# Patient Record
Sex: MALE | Race: AMERICAN INDIAN OR ALASKA NATIVE | NOT HISPANIC OR LATINO | Employment: FULL TIME | URBAN - METROPOLITAN AREA
[De-identification: names, ages, dates, MRNs, and addresses within clinical notes are randomized per-mention and may not be internally consistent; named-entity substitution may affect disease eponyms.]

---

## 2017-01-05 ENCOUNTER — TELEPHONE (OUTPATIENT)
Dept: FAMILY MEDICINE | Facility: CLINIC | Age: 52
End: 2017-01-05

## 2017-01-05 NOTE — TELEPHONE ENCOUNTER
Patient calling and wanting to increase the dosage of several medications.Advised to schedule an appointment to follow up on medications atenolol,gabapentin,and testosterone.Pt increased his b/p med a month ago to a tab and a half and says he is feeling much better. States that his most recent B/P was 160/97. Transferred patient to  for an appointment.

## 2017-01-23 ENCOUNTER — OFFICE VISIT (OUTPATIENT)
Dept: FAMILY MEDICINE | Facility: CLINIC | Age: 52
End: 2017-01-23
Payer: COMMERCIAL

## 2017-01-23 VITALS
WEIGHT: 264 LBS | DIASTOLIC BLOOD PRESSURE: 100 MMHG | OXYGEN SATURATION: 98 % | HEIGHT: 72 IN | BODY MASS INDEX: 35.76 KG/M2 | SYSTOLIC BLOOD PRESSURE: 150 MMHG | TEMPERATURE: 97.8 F | HEART RATE: 74 BPM | RESPIRATION RATE: 18 BRPM

## 2017-01-23 DIAGNOSIS — N52.9 ERECTILE DYSFUNCTION, UNSPECIFIED ERECTILE DYSFUNCTION TYPE: ICD-10-CM

## 2017-01-23 DIAGNOSIS — K21.9 GASTROESOPHAGEAL REFLUX DISEASE WITHOUT ESOPHAGITIS: ICD-10-CM

## 2017-01-23 DIAGNOSIS — G89.29 CHRONIC MIDLINE LOW BACK PAIN WITHOUT SCIATICA: ICD-10-CM

## 2017-01-23 DIAGNOSIS — E78.5 HYPERLIPIDEMIA LDL GOAL <130: ICD-10-CM

## 2017-01-23 DIAGNOSIS — E29.1 TESTICULAR HYPOFUNCTION: ICD-10-CM

## 2017-01-23 DIAGNOSIS — Z11.59 NEED FOR HEPATITIS C SCREENING TEST: ICD-10-CM

## 2017-01-23 DIAGNOSIS — Z12.11 SCREEN FOR COLON CANCER: Primary | ICD-10-CM

## 2017-01-23 DIAGNOSIS — I10 ESSENTIAL HYPERTENSION: ICD-10-CM

## 2017-01-23 DIAGNOSIS — M54.50 CHRONIC MIDLINE LOW BACK PAIN WITHOUT SCIATICA: ICD-10-CM

## 2017-01-23 LAB
ERYTHROCYTE [DISTWIDTH] IN BLOOD BY AUTOMATED COUNT: 12.9 % (ref 10–15)
HCT VFR BLD AUTO: 42.8 % (ref 40–53)
HGB BLD-MCNC: 15.6 G/DL (ref 13.3–17.7)
MCH RBC QN AUTO: 28.6 PG (ref 26.5–33)
MCHC RBC AUTO-ENTMCNC: 36.4 G/DL (ref 31.5–36.5)
MCV RBC AUTO: 79 FL (ref 78–100)
PLATELET # BLD AUTO: 239 10E9/L (ref 150–450)
RBC # BLD AUTO: 5.45 10E12/L (ref 4.4–5.9)
WBC # BLD AUTO: 6.2 10E9/L (ref 4–11)

## 2017-01-23 PROCEDURE — 36415 COLL VENOUS BLD VENIPUNCTURE: CPT | Performed by: FAMILY MEDICINE

## 2017-01-23 PROCEDURE — 84443 ASSAY THYROID STIM HORMONE: CPT | Performed by: FAMILY MEDICINE

## 2017-01-23 PROCEDURE — 80061 LIPID PANEL: CPT | Performed by: FAMILY MEDICINE

## 2017-01-23 PROCEDURE — 80048 BASIC METABOLIC PNL TOTAL CA: CPT | Performed by: FAMILY MEDICINE

## 2017-01-23 PROCEDURE — 85027 COMPLETE CBC AUTOMATED: CPT | Performed by: FAMILY MEDICINE

## 2017-01-23 PROCEDURE — 99213 OFFICE O/P EST LOW 20 MIN: CPT | Performed by: FAMILY MEDICINE

## 2017-01-23 PROCEDURE — 86803 HEPATITIS C AB TEST: CPT | Performed by: FAMILY MEDICINE

## 2017-01-23 RX ORDER — AMLODIPINE BESYLATE 5 MG/1
5 TABLET ORAL DAILY
Qty: 30 TABLET | Refills: 1 | Status: SHIPPED | OUTPATIENT
Start: 2017-01-23 | End: 2017-03-20

## 2017-01-23 RX ORDER — GABAPENTIN 400 MG/1
400 CAPSULE ORAL 3 TIMES DAILY
Qty: 90 CAPSULE | Refills: 5 | Status: SHIPPED | OUTPATIENT
Start: 2017-01-23 | End: 2017-04-03

## 2017-01-23 RX ORDER — NEBULIZER AND COMPRESSOR
EACH MISCELLANEOUS
Qty: 1 KIT | Refills: 0 | Status: SHIPPED | OUTPATIENT
Start: 2017-01-23 | End: 2019-06-13

## 2017-01-23 NOTE — PROGRESS NOTES
SUBJECTIVE:                                                    Dion Yepez is a 51 year old male who presents to clinic today for the following health issues:    Health Maintenance Due   Topic Date Due     HEPATITIS C SCREENING  09/02/1983     COLON CANCER SCREEN (SYSTEM ASSIGNED)  09/02/2015     Health Maintenance reviewed at today's visit patient asked to schedule/complete:   Colon Cancer:  Patient declined        Hypertension Follow-up      Outpatient blood pressures are being checked at home.  Results are high. Pt has increased BP meds to 1 and 1/2 tabs.    Low Salt Diet: no added salt       Amount of exercise or physical activity: rare     Problems taking medications regularly: No    Medication side effects: none  Diet: low salt and low fat/cholesterol  Medication Followup of Testosterone and gabapentin    Taking Medication as prescribed: yes    Side Effects:  None    Medication Helping Symptoms:  NO-pt would like to increase both       PROBLEMS TO ADD ON...    Problem list and histories reviewed & adjusted, as indicated.  Additional history: as documented  Hypertension and poor libido. Gives testosterone shots at home. Discussed getting tsh as still poor libido and if that's normal see urology. He is due for colonoscopy and tariq has upper gi sx.     Patient Active Problem List   Diagnosis     Chronic Back Pain     Testicular hypofunction     GERD (gastroesophageal reflux disease)     HYPERLIPIDEMIA LDL GOAL <130     Anxiety     Benign hypertension     Past Surgical History   Procedure Laterality Date     C reduce testis torsion  1978     Implant implantable cardioverter defibrillator  12/2010     Heart cath left heart cath  12/2010     [facility] - normal coronary arteries        Social History   Substance Use Topics     Smoking status: Never Smoker      Smokeless tobacco: Never Used     Alcohol Use: No     Family History   Problem Relation Age of Onset     Thyroid Disease Mother      Cancer -  colorectal Father      Cancer - colorectal Paternal Grandmother      Cancer - colorectal Paternal Grandfather      Cancer - colorectal Paternal Aunt      Cancer - colorectal Paternal Uncle      DIABETES No family hx of      Coronary Artery Disease No family hx of      Hypertension No family hx of      Hyperlipidemia No family hx of      CEREBROVASCULAR DISEASE No family hx of      Breast Cancer No family hx of      Colon Cancer No family hx of      Prostate Cancer No family hx of      Other Cancer No family hx of      Depression No family hx of      Anxiety Disorder No family hx of      MENTAL ILLNESS No family hx of      Substance Abuse No family hx of      Anesthesia Reaction No family hx of      Asthma No family hx of      OSTEOPOROSIS No family hx of      Genetic Disorder No family hx of      Obesity No family hx of      Unknown/Adopted No family hx of            ROS:  CONSTITUTIONAL:NEGATIVE for fever, chills, change in weight  INTEGUMENTARY/SKIN: NEGATIVE for worrisome rashes, moles or lesions  EYES: NEGATIVE for vision changes or irritation  ENT/MOUTH: NEGATIVE for ear, mouth and throat problems  RESP:NEGATIVE for significant cough or SOB  CV: NEGATIVE for chest pain, palpitations or peripheral edema  GI: NEGATIVE for nausea, abdominal pain, heartburn, or change in bowel habits  : erectile dysfunction  ENDOCRINE: NEGATIVE for temperature intolerance, skin/hair changes    OBJECTIVE:                                                    /100 mmHg  Pulse 74  Temp(Src) 97.8  F (36.6  C)  Resp 18  Ht 6' (1.829 m)  Wt 264 lb (119.75 kg)  BMI 35.80 kg/m2  SpO2 98%  Body mass index is 35.8 kg/(m^2).  GENERAL APPEARANCE: healthy, alert and no distress  EYES: Eyes grossly normal to inspection, PERRL and conjunctivae and sclerae normal  RESP: lungs clear to auscultation - no rales, rhonchi or wheezes  CV: regular rates and rhythm, normal S1 S2, no S3 or S4 and no murmur, click or rub    Diagnostic test  results:  Diagnostic Test Results:  Labs as ordered.      ASSESSMENT/PLAN:                                                    1. Screen for colon cancer    - Fecal colorectal cancer screen FIT - Future (S+30); Future  - GASTROENTEROLOGY ADULT REF PROCEDURE ONLY    2. Need for hepatitis C screening test    - Hepatitis C Screen Reflex to HCV RNA Quant and Genotype    3. Hyperlipidemia LDL goal <130    - Lipid Profile with reflex to direct LDL    4. Testicular hypofunction      5. Essential hypertension    - CBC with platelets  - Basic metabolic panel  - amLODIPine (NORVASC) 5 MG tablet; Take 1 tablet (5 mg) by mouth daily  Dispense: 30 tablet; Refill: 1  - Blood Pressure Monitoring (ADULT BLOOD PRESSURE CUFF LG) KIT; One unit  Dispense: 1 kit; Refill: 0    6. Chronic midline low back pain without sciatica    - gabapentin (NEURONTIN) 400 MG capsule; Take 1 capsule (400 mg) by mouth 3 times daily  Dispense: 90 capsule; Refill: 5  - amLODIPine (NORVASC) 5 MG tablet; Take 1 tablet (5 mg) by mouth daily  Dispense: 30 tablet; Refill: 1    7. Erectile dysfunction, unspecified erectile dysfunction type    - TSH    8. Gastroesophageal reflux disease without esophagitis    - GASTROENTEROLOGY ADULT REF PROCEDURE ONLY      Referral for gi and will see dr carver in [place].   Follow up with Provider - 2-4 weeks or as needed.      Dar Pascual MD  [facility]

## 2017-01-23 NOTE — PATIENT INSTRUCTIONS
Dr. Junior Larry MD  [address]  [address] [place]  T: [phone]  F: [phone]            [facility]  [address]  [address].  [facility]. [address]    [phone]    Dr machado, Dr. Moon

## 2017-01-23 NOTE — MR AVS SNAPSHOT
After Visit Summary   1/23/2017    Dion Yepez    MRN: [id number]           Patient Information     Date Of Birth          1965        Visit Information        Provider Department      1/23/2017 10:45 AM Dar Pascual MD [facility]        Today's Diagnoses     Screen for colon cancer    -  1     Need for hepatitis C screening test         Hyperlipidemia LDL goal <130         Testicular hypofunction         Essential hypertension         Chronic midline low back pain without sciatica         Erectile dysfunction, unspecified erectile dysfunction type           Care Instructions        Dr. Junior Larry MD  [address]  [address] [place]  T: [phone]  F: [phone]            [facility]  [address]  [address].  [facility]. [address]    [phone]    Dr machado, Dr. Moon            Follow-ups after your visit        Future tests that were ordered for you today     Open Future Orders        Priority Expected Expires Ordered    Fecal colorectal cancer screen FIT - Future (S+30) Routine 2/13/2017 2/22/2017 1/23/2017            Who to contact     If you have questions or need follow up information about today's clinic visit or your schedule please contact [facility] directly at [phone].  Normal or non-critical lab and imaging results will be communicated to you by MyChart, letter or phone within 4 business days after the clinic has received the results. If you do not hear from us within 7 days, please contact the clinic through MyChart or phone. If you have a critical or abnormal lab result, we will notify you by phone as soon as possible.  Submit refill requests through [employer] or call your pharmacy and they will forward the refill request to us. Please allow 3 business days for your refill to be completed.          Additional Information About Your Visit         [facility] [employer] Information     [employer] gives you secure access to your electronic health record. If you see a primary care provider, you can also send messages to your care team and make appointments. If you have questions, please call your primary care clinic.  If you do not have a primary care provider, please call [phone] and they will assist you.        Care EveryWhere ID     This is your Care EveryWhere ID. This could be used by other organizations to access your [place] medical records  [id number]        Your Vitals Were     Pulse Temperature Respirations Height BMI (Body Mass Index) Pulse Oximetry    74 97.8  F (36.6  C) 18 6' (1.829 m) 35.80 kg/m2 98%       Blood Pressure from Last 3 Encounters:   01/23/17 150/100   08/25/16 124/86   03/14/16 150/100    Weight from Last 3 Encounters:   01/23/17 264 lb (119.75 kg)   03/14/16 250 lb (113.399 kg)   11/16/15 232 lb (105.235 kg)              We Performed the Following     Basic metabolic panel     CBC with platelets     Hepatitis C Screen Reflex to HCV RNA Quant and Genotype     Lipid Profile with reflex to direct LDL     TSH          Today's Medication Changes          These changes are accurate as of: 1/23/17 11:23 AM.  If you have any questions, ask your nurse or doctor.               Start taking these medicines.        Dose/Directions    amLODIPine 5 MG tablet   Commonly known as:  NORVASC   Used for:  Essential hypertension, Chronic midline low back pain without sciatica   Started by:  Dar Pascual MD        Dose:  5 mg   Take 1 tablet (5 mg) by mouth daily   Quantity:  30 tablet   Refills:  1         These medicines have changed or have updated prescriptions.        Dose/Directions    * gabapentin 300 MG capsule   Commonly known as:  NEURONTIN   This may have changed:  Another medication with the same name was added. Make sure you understand how and when to take each.   Used for:  Midline low back pain without sciatica   Changed by:  Ankur  Dar Rodríguez MD        TAKE 1 CAPSULE BY MOUTH THREE TIMES A DAY   Quantity:  90 capsule   Refills:  9       * gabapentin 400 MG capsule   Commonly known as:  NEURONTIN   This may have changed:  You were already taking a medication with the same name, and this prescription was added. Make sure you understand how and when to take each.   Used for:  Chronic midline low back pain without sciatica   Changed by:  Dar Pascual MD        Dose:  400 mg   Take 1 capsule (400 mg) by mouth 3 times daily   Quantity:  90 capsule   Refills:  5       * Notice:  This list has 2 medication(s) that are the same as other medications prescribed for you. Read the directions carefully, and ask your doctor or other care provider to review them with you.         Where to get your medicines      These medications were sent to [facility] [address]  [address] [address]     Phone:  [phone]    - amLODIPine 5 MG tablet  - gabapentin 400 MG capsule             Primary Care Provider Office Phone # Fax #    Dar Pascual [phone]       [facility] [address]  [facility] [address]        Thank you!     Thank you for choosing [facility]  for your care. Our goal is always to provide you with excellent care. Hearing back from our patients is one way we can continue to improve our services. Please take a few minutes to complete the written survey that you may receive in the mail after your visit with us. Thank you!             Your Updated Medication List - Protect others around you: Learn how to safely use, store and throw away your medicines at www.disposemymeds.org.          This list is accurate as of: 1/23/17 11:23 AM.  Always use your most recent med list.                   Brand Name Dispense Instructions for use    amLODIPine 5 MG tablet    NORVASC    30 tablet    Take  "1 tablet (5 mg) by mouth daily       atenolol 25 MG tablet    TENORMIN    90 tablet    TAKE 1 TABLET BY MOUTH EVERY DAY       * B-D LUER-TONI SYRINGE 20G X 1\" 3 ML Misc   Generic drug:  Syringe/Needle (Disp)          * Syringe Luer Lock 23G X 1\" 3 ML Misc     12 each    1 Syringe every 21 days       cyclobenzaprine 10 MG tablet    FLEXERIL    30 tablet    TAKE 1/2 TO 1 TABLET BY MOUTH THREE TIMES A DAY AS NEEDED FOR MUSCLE SPASMS       * gabapentin 300 MG capsule    NEURONTIN    90 capsule    TAKE 1 CAPSULE BY MOUTH THREE TIMES A DAY       * gabapentin 400 MG capsule    NEURONTIN    90 capsule    Take 1 capsule (400 mg) by mouth 3 times daily       ibuprofen 800 MG tablet    ADVIL/MOTRIN    90 tablet    TAKE 1 TABLET BY MOUTH EVERY 8 HOURS AS NEEDED FOR PAIN       lisinopril-hydrochlorothiazide 20-12.5 MG per tablet    PRINZIDE/ZESTORETIC    90 tablet    Take 1 tablet by mouth daily       LORazepam 1 MG tablet    ATIVAN    50 tablet    Take 1 tablet (1 mg) by mouth every 8 hours as needed for anxiety       omeprazole 40 MG capsule    priLOSEC    90 capsule    TAKE 1 CAPSULE BY MOUTH EVERY DAY       order for DME      1 proten shake a day       SUBOXONE 8-2 MG per film   Generic drug:  buprenorphine HCl-naloxone HCl      Place 1 Film under the tongue 3 times daily       Syringe/Needle (Disp) 20G X 1\" 1 ML Misc     10 Device    Inject 1 mL into the muscle every 21 days       testosterone cypionate 200 MG/ML injection    DEPOTESTOTERONE CYPIONATE    10 mL    INJECT 1 ML INTRAMUSCULARLY EVERY 21 DAYS       * Notice:  This list has 4 medication(s) that are the same as other medications prescribed for you. Read the directions carefully, and ask your doctor or other care provider to review them with you.      "

## 2017-01-23 NOTE — Clinical Note
[place] [address]  [address]  [facility] [id number] [phone]                                                                                                           Dion Yepez  [address] [place] [address]    January 31, 2017      Dear Dion,    The results of your recent tests were reviewed and are enclosed.       Results for orders placed or performed in visit on 01/23/17   Hepatitis C Screen Reflex to HCV RNA Quant and Genotype   Result Value Ref Range    Hepatitis C Antibody  NR     Nonreactive   Assay performance characteristics have not been established for newborns,   infants, and children     TSH   Result Value Ref Range    TSH 3.83 0.40 - 4.00 mU/L   Lipid Profile with reflex to direct LDL   Result Value Ref Range    Cholesterol 173 <200 mg/dL    Triglycerides 82 <150 mg/dL    HDL Cholesterol 49 >39 mg/dL    LDL Cholesterol Calculated 108 (H) <100 mg/dL    Non HDL Cholesterol 124 <130 mg/dL   CBC with platelets   Result Value Ref Range    WBC 6.2 4.0 - 11.0 10e9/L    RBC Count 5.45 4.4 - 5.9 10e12/L    Hemoglobin 15.6 13.3 - 17.7 g/dL    Hematocrit 42.8 40.0 - 53.0 %    MCV 79 78 - 100 fl    MCH 28.6 26.5 - 33.0 pg    MCHC 36.4 31.5 - 36.5 g/dL    RDW 12.9 10.0 - 15.0 %    Platelet Count 239 150 - 450 10e9/L   Basic metabolic panel   Result Value Ref Range    Sodium 139 133 - 144 mmol/L    Potassium 4.7 3.4 - 5.3 mmol/L    Chloride 103 94 - 109 mmol/L    Carbon Dioxide 32 20 - 32 mmol/L    Anion Gap 4 3 - 14 mmol/L    Glucose 103 (H) 70 - 99 mg/dL    Urea Nitrogen 13 7 - 30 mg/dL    Creatinine 0.85 0.66 - 1.25 mg/dL    GFR Estimate >90  Non  GFR Calc   >60 mL/min/1.7m2    GFR Estimate If Black >90   GFR Calc   >60 mL/min/1.7m2    Calcium 9.4 8.5 - 10.1 mg/dL           Thank you for choosing [facility].  We appreciate the opportunity to serve you and look forward to  supporting your healthcare needs in the future.    If you have any questions or concerns, please call me or my staff at [phone].      Sincerely,    Dar Pascual MD

## 2017-01-23 NOTE — Clinical Note
"[facility]  [address]  [address]  [facility] [id number] [phone]      January 23, 2017        Dr. Junior Larry MD  [address]  [address] [place]  T: [phone]  F: [phone]      Dion Yepez  [address]  [place] [facility] [address]        Dear Dr. Larry:  Mr. Yepez was seen today. He has upper gi reflux and also is due for colonoscopy.  Please evaluate for these.    Patient Active Problem List   Diagnosis     Chronic Back Pain     Testicular hypofunction     GERD (gastroesophageal reflux disease)     HYPERLIPIDEMIA LDL GOAL <130     Anxiety     Benign hypertension     Current Outpatient Prescriptions   Medication Sig Dispense Refill     gabapentin (NEURONTIN) 400 MG capsule Take 1 capsule (400 mg) by mouth 3 times daily 90 capsule 5     amLODIPine (NORVASC) 5 MG tablet Take 1 tablet (5 mg) by mouth daily 30 tablet 1     Blood Pressure Monitoring (ADULT BLOOD PRESSURE CUFF LG) KIT One unit 1 kit 0     cyclobenzaprine (FLEXERIL) 10 MG tablet TAKE 1/2 TO 1 TABLET BY MOUTH THREE TIMES A DAY AS NEEDED FOR MUSCLE SPASMS 30 tablet 1     atenolol (TENORMIN) 25 MG tablet TAKE 1 TABLET BY MOUTH EVERY DAY 90 tablet 0     ibuprofen (ADVIL/MOTRIN) 800 MG tablet TAKE 1 TABLET BY MOUTH EVERY 8 HOURS AS NEEDED FOR PAIN 90 tablet 0     omeprazole (PRILOSEC) 40 MG capsule TAKE 1 CAPSULE BY MOUTH EVERY DAY 90 capsule 0     Syringe/Needle, Disp, (SYRINGE LUER LOCK) 23G X 1\" 3 ML MISC 1 Syringe every 21 days 12 each 6     testosterone cypionate (DEPOTESTOTERONE CYPIONATE) 200 MG/ML injection INJECT 1 ML INTRAMUSCULARLY EVERY 21 DAYS 10 mL 0     gabapentin (NEURONTIN) 300 MG capsule TAKE 1 CAPSULE BY MOUTH THREE TIMES A DAY 90 capsule 9     order for DME 1 proten shake a day       LORazepam (ATIVAN) 1 MG tablet Take 1 tablet (1 mg) by mouth every 8 hours as needed for anxiety 50 tablet 2     SUBOXONE 8-2 MG film Place 1 Film under the " "tongue 3 times daily   0     B-D LUER-TONI SYRINGE 20G X 1\" 3 ML MISC        Syringe/Needle, Disp, 20G X 1\" 1 ML MISC Inject 1 mL into the muscle every 21 days 10 Device 11     lisinopril-hydrochlorothiazide (PRINZIDE,ZESTORETIC) 20-12.5 MG per tablet Take 1 tablet by mouth daily 90 tablet 0     Thank you for seeing Mr. Yepez.       Sincerely,      Dar Pascual MD            "

## 2017-01-23 NOTE — NURSING NOTE
Chief Complaint   Patient presents with     Recheck Medication       Initial /100 mmHg  Pulse 74  Temp(Src) 97.8  F (36.6  C)  Resp 18  Ht 6' (1.829 m)  Wt 264 lb (119.75 kg)  BMI 35.80 kg/m2  SpO2 98% Estimated body mass index is 35.8 kg/(m^2) as calculated from the following:    Height as of this encounter: 6' (1.829 m).    Weight as of this encounter: 264 lb (119.75 kg).  BP completed using cuff size: large  S Gutzman CMA

## 2017-01-24 LAB
ANION GAP SERPL CALCULATED.3IONS-SCNC: 4 MMOL/L (ref 3–14)
BUN SERPL-MCNC: 13 MG/DL (ref 7–30)
CALCIUM SERPL-MCNC: 9.4 MG/DL (ref 8.5–10.1)
CHLORIDE SERPL-SCNC: 103 MMOL/L (ref 94–109)
CHOLEST SERPL-MCNC: 173 MG/DL
CO2 SERPL-SCNC: 32 MMOL/L (ref 20–32)
CREAT SERPL-MCNC: 0.85 MG/DL (ref 0.66–1.25)
GFR SERPL CREATININE-BSD FRML MDRD: ABNORMAL ML/MIN/1.7M2
GLUCOSE SERPL-MCNC: 103 MG/DL (ref 70–99)
HCV AB SERPL QL IA: NORMAL
HDLC SERPL-MCNC: 49 MG/DL
LDLC SERPL CALC-MCNC: 108 MG/DL
NONHDLC SERPL-MCNC: 124 MG/DL
POTASSIUM SERPL-SCNC: 4.7 MMOL/L (ref 3.4–5.3)
SODIUM SERPL-SCNC: 139 MMOL/L (ref 133–144)
TRIGL SERPL-MCNC: 82 MG/DL
TSH SERPL DL<=0.05 MIU/L-ACNC: 3.83 MU/L (ref 0.4–4)

## 2017-02-21 DIAGNOSIS — M54.30 SCIATICA, UNSPECIFIED LATERALITY: ICD-10-CM

## 2017-02-21 NOTE — TELEPHONE ENCOUNTER
Flexeril      Last Written Prescription Date:  1/5/2017  Last Fill Quantity: 30,   # refills: 1  Last Office Visit with [facility] [place] prescribing provider: 1/23/2017  Future Office visit:       Routing refill request to provider for review/approval because:  Drug not on the [facility] [place] refill protocol or controlled substance

## 2017-02-27 RX ORDER — CYCLOBENZAPRINE HCL 10 MG
TABLET ORAL
Qty: 30 TABLET | Refills: 1 | Status: SHIPPED | OUTPATIENT
Start: 2017-02-27 | End: 2017-04-04

## 2017-03-01 DIAGNOSIS — M54.50 CHRONIC MIDLINE LOW BACK PAIN WITHOUT SCIATICA: ICD-10-CM

## 2017-03-01 DIAGNOSIS — G89.29 CHRONIC MIDLINE LOW BACK PAIN WITHOUT SCIATICA: ICD-10-CM

## 2017-03-01 NOTE — TELEPHONE ENCOUNTER
Ibuprofen 800 mg      Last Written Prescription Date: 2/1/17  Last Quantity: 90, # refills: 0  [place] [facility] [place] [facility] prescribing provider: 1/23/17       Creatinine   Date Value Ref Range Status   01/23/2017 0.85 0.66 - 1.25 mg/dL Final     Lab Results   Component Value Date    AST 48 02/12/2016     Lab Results   Component Value Date    ALT 58 02/12/2016     BP Readings from Last 3 Encounters:   01/23/17 (!) 150/100   08/25/16 124/86   03/14/16 (!) 150/100

## 2017-03-02 RX ORDER — IBUPROFEN 800 MG/1
TABLET, FILM COATED ORAL
Qty: 90 TABLET | Refills: 0 | Status: SHIPPED | OUTPATIENT
Start: 2017-03-02 | End: 2017-03-20

## 2017-03-06 ENCOUNTER — OFFICE VISIT (OUTPATIENT)
Dept: FAMILY MEDICINE | Facility: CLINIC | Age: 52
End: 2017-03-06
Payer: COMMERCIAL

## 2017-03-06 ENCOUNTER — TELEPHONE (OUTPATIENT)
Dept: FAMILY MEDICINE | Facility: CLINIC | Age: 52
End: 2017-03-06

## 2017-03-06 VITALS
SYSTOLIC BLOOD PRESSURE: 142 MMHG | TEMPERATURE: 98.1 F | HEART RATE: 69 BPM | OXYGEN SATURATION: 96 % | RESPIRATION RATE: 18 BRPM | BODY MASS INDEX: 36.89 KG/M2 | WEIGHT: 272 LBS | DIASTOLIC BLOOD PRESSURE: 94 MMHG

## 2017-03-06 DIAGNOSIS — M54.50 CHRONIC MIDLINE LOW BACK PAIN WITHOUT SCIATICA: Primary | ICD-10-CM

## 2017-03-06 DIAGNOSIS — K29.50 CHRONIC GASTRITIS WITHOUT BLEEDING, UNSPECIFIED GASTRITIS TYPE: ICD-10-CM

## 2017-03-06 DIAGNOSIS — K29.50 CHRONIC GASTRITIS WITHOUT BLEEDING, UNSPECIFIED GASTRITIS TYPE: Primary | ICD-10-CM

## 2017-03-06 DIAGNOSIS — G89.29 CHRONIC MIDLINE LOW BACK PAIN WITHOUT SCIATICA: Primary | ICD-10-CM

## 2017-03-06 DIAGNOSIS — E29.1 TESTICULAR HYPOFUNCTION: ICD-10-CM

## 2017-03-06 DIAGNOSIS — Z12.11 SCREEN FOR COLON CANCER: ICD-10-CM

## 2017-03-06 PROCEDURE — 99214 OFFICE O/P EST MOD 30 MIN: CPT | Performed by: FAMILY MEDICINE

## 2017-03-06 RX ORDER — TESTOSTERONE CYPIONATE 200 MG/ML
INJECTION, SOLUTION INTRAMUSCULAR
Qty: 10 ML | Refills: 0 | Status: SHIPPED | OUTPATIENT
Start: 2017-03-06 | End: 2017-03-06

## 2017-03-06 RX ORDER — SYRINGE W-NEEDLE,DISPOSAB,3 ML 23GX1"
1 SYRINGE, EMPTY DISPOSABLE MISCELLANEOUS
Qty: 12 EACH | Refills: 6 | Status: SHIPPED | OUTPATIENT
Start: 2017-03-06 | End: 2018-01-20

## 2017-03-06 RX ORDER — CIMETIDINE 300 MG
300 TABLET ORAL AT BEDTIME
Qty: 90 TABLET | Refills: 1 | Status: SHIPPED | OUTPATIENT
Start: 2017-03-06 | End: 2019-06-13

## 2017-03-06 RX ORDER — GABAPENTIN 600 MG/1
600 TABLET ORAL 3 TIMES DAILY
Qty: 90 TABLET | Refills: 1 | Status: SHIPPED | OUTPATIENT
Start: 2017-03-06 | End: 2017-04-25 | Stop reason: DRUGHIGH

## 2017-03-06 RX ORDER — TESTOSTERONE CYPIONATE 200 MG/ML
INJECTION, SOLUTION INTRAMUSCULAR
Qty: 10 ML | Refills: 0 | Status: SHIPPED | OUTPATIENT
Start: 2017-03-06 | End: 2017-05-22

## 2017-03-06 NOTE — MR AVS SNAPSHOT
After Visit Summary   3/6/2017    Dion Yepez    MRN: [id number]           Patient Information     Date Of Birth          1965        Visit Information        Provider Department      3/6/2017 2:15 PM Dar Pascual MD [facility]        Today's Diagnoses     Chronic midline low back pain without sciatica    -  1    Chronic gastritis without bleeding, unspecified gastritis type        Testicular hypofunction          Care Instructions    Your provider has referred you to: [facility]: [facility] - ADHD & Bariatric Assessments - Adult ADHD Evaluations - [facility] [phone]   [email or web address]   *Please call to schedule an appointment.    All scheduling is subject to the client's specific insurance plan & benefits, provider/location availability, and provider         Follow-ups after your visit        Who to contact     If you have questions or need follow up information about today's clinic visit or your schedule please contact [facility] directly at [phone].  Normal or non-critical lab and imaging results will be communicated to you by "[employer]hart, letter or phone within 4 business days after the clinic has received the results. If you do not hear from us within 7 days, please contact the clinic through "[employer]hart or phone. If you have a critical or abnormal lab result, we will notify you by phone as soon as possible.  Submit refill requests through Webjam or call your pharmacy and they will forward the refill request to us. Please allow 3 business days for your refill to be completed.          Additional Information About Your Visit        MyChart Information     Webjam gives you secure access to your electronic health record. If you see a primary care provider, you can also send messages to your care team and make  appointments. If you have questions, please call your primary care clinic.  If you do not have a primary care provider, please call [phone] and they will assist you.        Care EveryWhere ID     This is your Care EveryWhere ID. This could be used by other organizations to access your [place] medical records  [id number]        Your Vitals Were     Pulse Temperature Respirations Pulse Oximetry BMI (Body Mass Index)       69 98.1  F (36.7  C) 18 96% 36.89 kg/m2        Blood Pressure from Last 3 Encounters:   03/06/17 (!) 142/94   01/23/17 (!) 150/100   08/25/16 124/86    Weight from Last 3 Encounters:   03/06/17 272 lb (123.4 kg)   01/23/17 264 lb (119.7 kg)   03/14/16 250 lb (113.4 kg)              Today, you had the following     No orders found for display         Today's Medication Changes          These changes are accurate as of: 3/6/17  2:23 PM.  If you have any questions, ask your nurse or doctor.               Start taking these medicines.        Dose/Directions    cimetidine 300 MG tablet   Commonly known as:  TAGAMET   Used for:  Chronic gastritis without bleeding, unspecified gastritis type   Started by:  Dar Pascual MD        Dose:  300 mg   Take 1 tablet (300 mg) by mouth At Bedtime   Quantity:  90 tablet   Refills:  1       testosterone cypionate 200 MG/ML injection   Commonly known as:  DEPOTESTOTERONE CYPIONATE   Used for:  Testicular hypofunction   Started by:  Dar Pascual MD        INJECT 1 ML INTRAMUSCULARLY EVERY 21 DAYS   Quantity:  10 mL   Refills:  0         These medicines have changed or have updated prescriptions.        Dose/Directions    * gabapentin 300 MG capsule   Commonly known as:  NEURONTIN   This may have changed:  Another medication with the same name was added. Make sure you understand how and when to take each.   Used for:  Midline low back pain without sciatica   Changed by:  Dar Pascual MD        TAKE 1 CAPSULE BY MOUTH THREE TIMES A DAY  "  Quantity:  90 capsule   Refills:  9       * gabapentin 400 MG capsule   Commonly known as:  NEURONTIN   This may have changed:  Another medication with the same name was added. Make sure you understand how and when to take each.   Used for:  Chronic midline low back pain without sciatica   Changed by:  Dar Pascual MD        Dose:  400 mg   Take 1 capsule (400 mg) by mouth 3 times daily   Quantity:  90 capsule   Refills:  5       * gabapentin 600 MG tablet   Commonly known as:  NEURONTIN   This may have changed:  You were already taking a medication with the same name, and this prescription was added. Make sure you understand how and when to take each.   Used for:  Chronic midline low back pain without sciatica   Changed by:  Dar Pascual MD        Dose:  600 mg   Take 1 tablet (600 mg) by mouth 3 times daily   Quantity:  90 tablet   Refills:  1       * Notice:  This list has 3 medication(s) that are the same as other medications prescribed for you. Read the directions carefully, and ask your doctor or other care provider to review them with you.         Where to get your medicines      These medications were sent to [facility] [place], [place] - [address]  [address], [place] [address]     Phone:  [phone]     cimetidine 300 MG tablet    gabapentin 600 MG tablet         Some of these will need a paper prescription and others can be bought over the counter.  Ask your nurse if you have questions.     Bring a paper prescription for each of these medications     Syringe Luer Lock 23G X 1\" 3 ML Misc    testosterone cypionate 200 MG/ML injection                Primary Care Provider Office Phone # Fax #    Dar Pascual [phone]       [facility] [address]  [place] [address]        Thank you!     Thank you for choosing [facility]  for your care. Our goal is " "always to provide you with excellent care. Hearing back from our patients is one way we can continue to improve our services. Please take a few minutes to complete the written survey that you may receive in the mail after your visit with us. Thank you!             Your Updated Medication List - Protect others around you: Learn how to safely use, store and throw away your medicines at www.disposemymeds.org.          This list is accurate as of: 3/6/17  2:23 PM.  Always use your most recent med list.                   Brand Name Dispense Instructions for use    Adult Blood Pressure Cuff Lg Kit     1 kit    One unit       amLODIPine 5 MG tablet    NORVASC    30 tablet    Take 1 tablet (5 mg) by mouth daily       atenolol 25 MG tablet    TENORMIN    90 tablet    TAKE 1 TABLET BY MOUTH EVERY DAY       * B-D LUER-TONI SYRINGE 20G X 1\" 3 ML Misc   Generic drug:  Syringe/Needle (Disp)          * Syringe Luer Lock 23G X 1\" 3 ML Misc     12 each    1 Syringe every 21 days       cimetidine 300 MG tablet    TAGAMET    90 tablet    Take 1 tablet (300 mg) by mouth At Bedtime       cyclobenzaprine 10 MG tablet    FLEXERIL    30 tablet    TAKE 1/2 TO 1 TABLET BY MOUTH THREE TIMES A DAY AS NEEDED FOR MUSCLE SPASMS       * gabapentin 300 MG capsule    NEURONTIN    90 capsule    TAKE 1 CAPSULE BY MOUTH THREE TIMES A DAY       * gabapentin 400 MG capsule    NEURONTIN    90 capsule    Take 1 capsule (400 mg) by mouth 3 times daily       * gabapentin 600 MG tablet    NEURONTIN    90 tablet    Take 1 tablet (600 mg) by mouth 3 times daily       ibuprofen 800 MG tablet    ADVIL/MOTRIN    90 tablet    TAKE 1 TABLET BY MOUTH EVERY 8 HOURS AS NEEDED FOR PAIN       lisinopril-hydrochlorothiazide 20-12.5 MG per tablet    PRINZIDE/ZESTORETIC    90 tablet    Take 1 tablet by mouth daily       LORazepam 1 MG tablet    ATIVAN    50 tablet    Take 1 tablet (1 mg) by mouth every 8 hours as needed for anxiety       omeprazole 40 MG capsule    priLOSEC " "   90 capsule    TAKE 1 CAPSULE BY MOUTH EVERY DAY       order for DME      1 proten shake a day       SUBOXONE 8-2 MG per film   Generic drug:  buprenorphine HCl-naloxone HCl      Place 1 Film under the tongue 3 times daily       Syringe/Needle (Disp) 20G X 1\" 1 ML Misc     10 Device    Inject 1 mL into the muscle every 21 days       testosterone cypionate 200 MG/ML injection    DEPOTESTOTERONE CYPIONATE    10 mL    INJECT 1 ML INTRAMUSCULARLY EVERY 21 DAYS       * Notice:  This list has 5 medication(s) that are the same as other medications prescribed for you. Read the directions carefully, and ask your doctor or other care provider to review them with you.      "

## 2017-03-06 NOTE — PATIENT INSTRUCTIONS
Your provider has referred you to: [facility]: [facility] - ADHD & Bariatric Assessments - Adult ADHD Evaluations - [facility] [phone]   [email or web address]/   *Please call to schedule an appointment.    All scheduling is subject to the client's specific insurance plan & benefits, provider/location availability, and provider

## 2017-03-06 NOTE — PROGRESS NOTES
SUBJECTIVE:                                                    Dion Yepez is a 51 year old male who presents to clinic today for the following health issues:    Health Maintenance Due   Topic Date Due     COLON CANCER SCREEN (SYSTEM ASSIGNED)  09/02/2015     Health Maintenance reviewed at today's visit patient asked to schedule/complete:   Colon Cancer:  Patient agrees to schedule    Follow up of medications and lbood pressure also concerned aobut foster child with emotional needs has had difficulty with sdchool discussed otions of learning evaluation with [facility] or [facility]. Discussed pts blood pressure asl needs refill of testosterone. Discussed exercise.     Medication Followup of blood pressure meds    Taking Medication as prescribed: yes    Side Effects:  None    Medication Helping Symptoms:  Yes    Pt would like to discuss changing omeprazole and increase neurotin       PROBLEMS TO ADD ON...    Problem list and histories reviewed & adjusted, as indicated.  Additional history: as documented    Patient Active Problem List   Diagnosis     Chronic Back Pain     Testicular hypofunction     GERD (gastroesophageal reflux disease)     HYPERLIPIDEMIA LDL GOAL <130     Anxiety     Benign hypertension     Past Surgical History   Procedure Laterality Date     C reduce testis torsion  1978     Implant implantable cardioverter defibrillator  12/2010     Heart cath left heart cath  12/2010     [facility] - normal coronary arteries        Social History   Substance Use Topics     Smoking status: Never Smoker     Smokeless tobacco: Never Used     Alcohol use No     Family History   Problem Relation Age of Onset     Thyroid Disease Mother      Cancer - colorectal Father      Cancer - colorectal Paternal Grandmother      Cancer - colorectal Paternal Grandfather      Cancer - colorectal Paternal Aunt      Cancer - colorectal Paternal Uncle      DIABETES No family hx of      Coronary Artery  Disease No family hx of      Hypertension No family hx of      Hyperlipidemia No family hx of      CEREBROVASCULAR DISEASE No family hx of      Breast Cancer No family hx of      Colon Cancer No family hx of      Prostate Cancer No family hx of      Other Cancer No family hx of      Depression No family hx of      Anxiety Disorder No family hx of      MENTAL ILLNESS No family hx of      Substance Abuse No family hx of      Anesthesia Reaction No family hx of      Asthma No family hx of      OSTEOPOROSIS No family hx of      Genetic Disorder No family hx of      Obesity No family hx of      Unknown/Adopted No family hx of            Reviewed and updated as needed this visit by clinical staff       Reviewed and updated as needed this visit by Provider         ROS:  CONSTITUTIONAL:NEGATIVE for fever, chills, change in weight  INTEGUMENTARY/SKIN: NEGATIVE for worrisome rashes, moles or lesions  RESP:NEGATIVE for significant cough or SOB  CV: NEGATIVE for chest pain, palpitations or peripheral edema  GI: NEGATIVE for nausea, abdominal pain, heartburn, or change in bowel habits  MUSCULOSKELETAL: NEGATIVE for significant arthralgias or myalgia  NEURO: NEGATIVE for weakness, dizziness or paresthesias    OBJECTIVE:                                                    BP (!) 142/94  Pulse 69  Temp 98.1  F (36.7  C)  Resp 18  Wt 272 lb (123.4 kg)  SpO2 96%  BMI 36.89 kg/m2  Body mass index is 36.89 kg/(m^2).  GENERAL APPEARANCE: healthy, alert and no distress  EYES: Eyes grossly normal to inspection, PERRL and conjunctivae and sclerae normal  RESP: lungs clear to auscultation - no rales, rhonchi or wheezes  CV: regular rates and rhythm, normal S1 S2, no S3 or S4 and no murmur, click or rub  SKIN: no suspicious lesions or rashes  NEURO: Normal strength and tone, mentation intact and speech normal  PSYCH: mentation appears normal and affect normal/bright    Diagnostic test results:  Diagnostic Test Results:  none   "    ASSESSMENT/PLAN:                                                    1. Chronic midline low back pain without sciatica  Labs and medications as ordered discussed gi evaluation and taylor have some upper gi sx.   - gabapentin (NEURONTIN) 600 MG tablet; Take 1 tablet (600 mg) by mouth 3 times daily  Dispense: 90 tablet; Refill: 1    2. Chronic gastritis without bleeding, unspecified gastritis type    - cimetidine (TAGAMET) 300 MG tablet; Take 1 tablet (300 mg) by mouth At Bedtime  Dispense: 90 tablet; Refill: 1  - GASTROENTEROLOGY ADULT REF PROCEDURE ONLY    3. Testicular hypofunction    - Syringe/Needle, Disp, (SYRINGE LUER LOCK) 23G X 1\" 3 ML MISC; 1 Syringe every 21 days  Dispense: 12 each; Refill: 6  - testosterone cypionate (DEPOTESTOTERONE CYPIONATE) 200 MG/ML injection; INJECT 1 ML INTRAMUSCULARLY EVERY 21 DAYS  Dispense: 10 mL; Refill: 0    4. Screen for colon cancer    - GASTROENTEROLOGY ADULT REF PROCEDURE ONLY      Labs, meds and referrals as ordered  rtc 2-4 week or as needed.he does testosterone injections at [facility] by self, needs testosterone and needles and syringes.       Dar Pascual MD  [facility]    "

## 2017-03-06 NOTE — TELEPHONE ENCOUNTER
cimetidine (TAGAMET) 300 MG tablet is not on formulary please send a new rx for Ranitidine 150 mg

## 2017-03-06 NOTE — NURSING NOTE
Chief Complaint   Patient presents with     Recheck Medication       Initial BP (!) 142/94  Pulse 69  Temp 98.1  F (36.7  C)  Resp 18  Wt 272 lb (123.4 kg)  SpO2 96%  BMI 36.89 kg/m2 Estimated body mass index is 36.89 kg/(m^2) as calculated from the following:    Height as of 1/23/17: 6' (1.829 m).    Weight as of this encounter: 272 lb (123.4 kg).  Medication Reconciliation: morteza Graham CMA

## 2017-04-03 DIAGNOSIS — M54.30 SCIATICA, UNSPECIFIED LATERALITY: Primary | ICD-10-CM

## 2017-04-03 DIAGNOSIS — M54.50 CHRONIC MIDLINE LOW BACK PAIN WITHOUT SCIATICA: ICD-10-CM

## 2017-04-03 DIAGNOSIS — G89.29 CHRONIC MIDLINE LOW BACK PAIN WITHOUT SCIATICA: ICD-10-CM

## 2017-04-03 RX ORDER — GABAPENTIN 400 MG/1
400 CAPSULE ORAL 3 TIMES DAILY
Qty: 180 CAPSULE | Refills: 6 | Status: SHIPPED | OUTPATIENT
Start: 2017-04-03 | End: 2017-04-25 | Stop reason: DRUGHIGH

## 2017-04-03 NOTE — TELEPHONE ENCOUNTER
Dr Pascual, Patient calling today to report that he is having side effects from the increase of Gabapentin from 400 mg to 600 mg.  Stomach upset with diarrhea.  He would like to go back to 400 mg.  He will need a new Rx. Pharmacy pended.   Staci Mahajan RN- Triage [employer]

## 2017-04-03 NOTE — TELEPHONE ENCOUNTER
Reason for Call:  Other call back    Detailed comments: Patient is wondering if he can go back to the Neurontin 400 mg instead of 800 mg. Please call to discuss    Phone Number Patient can be reached at: Home number on file [phone] (home)    Best Time: Any    Can we leave a detailed message on this number? YES    Call taken on 4/3/2017 at 11:08 AM by ANGEL WALKER

## 2017-04-24 DIAGNOSIS — M54.50 CHRONIC MIDLINE LOW BACK PAIN WITHOUT SCIATICA: ICD-10-CM

## 2017-04-24 DIAGNOSIS — G89.29 CHRONIC MIDLINE LOW BACK PAIN WITHOUT SCIATICA: ICD-10-CM

## 2017-04-24 NOTE — TELEPHONE ENCOUNTER
Reason for Call:  Other prescription  Detailed comments: patient has a question on a medication gabapentin (NEURONTIN) 400 MG capsule  Phone Number Patient can be reached at: Home number on file [phone] (home)  Best Time: any  Can we leave a detailed message on this number? YES  Call taken on 4/24/2017 at 11:49 AM by MILLY ROJAS

## 2017-04-24 NOTE — TELEPHONE ENCOUNTER
Patient was called back, abdominal symptoms have improved since he started taking 400mg Gabapentin.   He still has upset stomach: acid reflux and diarrhea. Would like to go down to 300mg - previously he didn't have issues at this dose.     Will route to PCP for review. Provider to discontinue other dosages from medlist as appropriate.

## 2017-04-25 RX ORDER — GABAPENTIN 300 MG/1
CAPSULE ORAL
Qty: 90 CAPSULE | Refills: 9 | Status: SHIPPED | OUTPATIENT
Start: 2017-04-25 | End: 2017-09-20

## 2017-05-15 DIAGNOSIS — G89.29 CHRONIC MIDLINE LOW BACK PAIN WITHOUT SCIATICA: ICD-10-CM

## 2017-05-15 DIAGNOSIS — I10 BENIGN HYPERTENSION: ICD-10-CM

## 2017-05-15 DIAGNOSIS — I10 ESSENTIAL HYPERTENSION: ICD-10-CM

## 2017-05-15 DIAGNOSIS — M54.30 SCIATICA, UNSPECIFIED LATERALITY: ICD-10-CM

## 2017-05-15 DIAGNOSIS — M54.50 CHRONIC MIDLINE LOW BACK PAIN WITHOUT SCIATICA: ICD-10-CM

## 2017-05-15 NOTE — TELEPHONE ENCOUNTER
ATENOLOL 25 MG TABLET  Last Written Prescription Date: 04/06/17  Last Fill Quantity: 30, # refills: 0  Last Office Visit with [facility], [facility] or [employer] prescribing provider: 03/06/17       Potassium   Date Value Ref Range Status   01/23/2017 4.7 3.4 - 5.3 mmol/L Final     Creatinine   Date Value Ref Range Status   01/23/2017 0.85 0.66 - 1.25 mg/dL Final     BP Readings from Last 3 Encounters:   03/06/17 (!) 142/94   01/23/17 (!) 150/100   08/25/16 124/86         CYCLOBENZAPRINE 10 MG TABLET  Last Written Prescription Date:  04/06/17  Last Fill Quantity: 30,   # refills: 1  Last Office Visit with [facility], [facility] or [employer] prescribing provider: 03/06/17  Future Office visit: none    Routing refill request to provider for review/approval because:  Drug not on the [facility] or [employer] [facility] refill protocol or controlled substance      AMLODIPINE BESYLATE 5 MG TAB  Last Written Prescription Date: 03/21/17  Last Fill Quantity: 30, # refills: 1  [facility] [employer] prescribing provider: 03/06/17       Potassium   Date Value Ref Range Status   01/23/2017 4.7 3.4 - 5.3 mmol/L Final     Creatinine   Date Value Ref Range Status   01/23/2017 0.85 0.66 - 1.25 mg/dL Final     BP Readings from Last 3 Encounters:   03/06/17 (!) 142/94   01/23/17 (!) 150/100   08/25/16 124/86

## 2017-05-16 NOTE — TELEPHONE ENCOUNTER
Atenolol and Amlodipine     Provider for review/approval because:  BP elevated at last visits

## 2017-05-18 RX ORDER — ATENOLOL 25 MG/1
TABLET ORAL
Qty: 30 TABLET | Refills: 0 | Status: SHIPPED | OUTPATIENT
Start: 2017-05-18 | End: 2017-06-13

## 2017-05-18 RX ORDER — CYCLOBENZAPRINE HCL 10 MG
TABLET ORAL
Qty: 30 TABLET | Refills: 1 | Status: SHIPPED | OUTPATIENT
Start: 2017-05-18 | End: 2017-07-12

## 2017-05-18 RX ORDER — AMLODIPINE BESYLATE 5 MG/1
TABLET ORAL
Qty: 30 TABLET | Refills: 1 | Status: SHIPPED | OUTPATIENT
Start: 2017-05-18 | End: 2017-07-12

## 2017-05-22 ENCOUNTER — OFFICE VISIT (OUTPATIENT)
Dept: FAMILY MEDICINE | Facility: CLINIC | Age: 52
End: 2017-05-22
Payer: COMMERCIAL

## 2017-05-22 VITALS
RESPIRATION RATE: 16 BRPM | DIASTOLIC BLOOD PRESSURE: 84 MMHG | SYSTOLIC BLOOD PRESSURE: 136 MMHG | TEMPERATURE: 98.1 F | WEIGHT: 269 LBS | HEIGHT: 72 IN | HEART RATE: 65 BPM | BODY MASS INDEX: 36.44 KG/M2 | OXYGEN SATURATION: 97 %

## 2017-05-22 DIAGNOSIS — K21.00 GASTROESOPHAGEAL REFLUX DISEASE WITH ESOPHAGITIS: Primary | ICD-10-CM

## 2017-05-22 DIAGNOSIS — G89.29 CHRONIC MIDLINE LOW BACK PAIN WITHOUT SCIATICA: ICD-10-CM

## 2017-05-22 DIAGNOSIS — M54.50 CHRONIC MIDLINE LOW BACK PAIN WITHOUT SCIATICA: ICD-10-CM

## 2017-05-22 DIAGNOSIS — Z00.00 ROUTINE GENERAL MEDICAL EXAMINATION AT A HEALTH CARE FACILITY: ICD-10-CM

## 2017-05-22 DIAGNOSIS — E29.1 TESTICULAR HYPOFUNCTION: ICD-10-CM

## 2017-05-22 PROCEDURE — 99396 PREV VISIT EST AGE 40-64: CPT | Performed by: FAMILY MEDICINE

## 2017-05-22 RX ORDER — TESTOSTERONE CYPIONATE 200 MG/ML
INJECTION, SOLUTION INTRAMUSCULAR
Qty: 10 ML | Refills: 0 | Status: SHIPPED | OUTPATIENT
Start: 2017-05-22 | End: 2017-09-20

## 2017-05-22 RX ORDER — IBUPROFEN 800 MG/1
TABLET, FILM COATED ORAL
Qty: 90 TABLET | Refills: 5 | Status: SHIPPED | OUTPATIENT
Start: 2017-05-22 | End: 2017-11-13

## 2017-05-22 NOTE — PROGRESS NOTES
SUBJECTIVE:     CC: Dion Yepez is an 51 year old male who presents for preventative health visit.     Healthy Habits:    Do you get at least three servings of calcium containing foods daily (dairy, green leafy vegetables, etc.)? yes    Amount of exercise or daily activities, outside of work: 4-5 day(s) per week    Problems taking medications regularly No    Medication side effects: No    Have you had an eye exam in the past two years? yes    Do you see a dentist twice per year? once    Do you have sleep apnea, excessive snoring or daytime drowsiness?no        PROBLEMS TO ADD ON...    Today's PHQ-2 Score:   PHQ-2 ( 1999 [employer]) 5/22/2017 5/4/2016   Q1: Little interest or pleasure in doing things 0 0   Q2: Feeling down, depressed or hopeless 0 0   PHQ-2 Score 0 0       Abuse: Current or Past(Physical, Sexual or Emotional)- No  Do you feel safe in your environment - Yes    Social History   Substance Use Topics     Smoking status: Never Smoker     Smokeless tobacco: Never Used     Alcohol use No     The patient does not drink >3 drinks per day nor >7 drinks per week.    Last PSA: No results found for: PSA    Recent Labs   Lab Test  01/23/17   1126  02/24/15   1429  12/16/13   1126   CHOL  173  163  185   HDL  49  48  64   LDL  108*  95  106   TRIG  82  100  73   CHOLHDLRATIO   --   3.4  2.9   NHDL  124   --    --      HAS DEFIBRILLATOR- WAS GETTING LBACK OUT SPELLS.   GETS REFLUX.    Reviewed orders with patient. Reviewed health maintenance and updated orders accordingly - Yes    Reviewed and updated as needed this visit by clinical staff  Tobacco  Allergies  Med Hx  Surg Hx  Fam Hx  Soc Hx        Reviewed and updated as needed this visit by Provider            ROS:  C: NEGATIVE for fever, chills, change in weight  I: NEGATIVE for worrisome rashes, moles or lesions  E: NEGATIVE for vision changes or irritation  ENT: NEGATIVE for ear, mouth and throat problems  R: NEGATIVE for significant cough or  SOB  CV: NEGATIVE for chest pain, palpitations or peripheral edema  GI: NEGATIVE for nausea, abdominal pain, heartburn, or change in bowel habits   male: negative for dysuria, hematuria, decreased urinary stream, erectile dysfunction, urethral discharge  M: NEGATIVE for significant arthralgias or myalgia  N: NEGATIVE for weakness, dizziness or paresthesias  P: NEGATIVE for changes in mood or affect    Problem list, Medication list, Allergies, and Medical/Social/Surgical histories reviewed in [facility] and updated as appropriate.  OBJECTIVE:     /84  Pulse 65  Temp 98.1  F (36.7  C)  Resp 16  Ht 6' (1.829 m)  Wt 269 lb (122 kg)  SpO2 97%  BMI 36.48 kg/m2  EXAM:  GENERAL: healthy, alert and no distress  EYES: Eyes grossly normal to inspection, PERRL and conjunctivae and sclerae normal  HENT: ear canals and TM's normal, nose and mouth without ulcers or lesions  NECK: no adenopathy, no asymmetry, masses, or scars and thyroid normal to palpation  RESP: lungs clear to auscultation - no rales, rhonchi or wheezes  CV: regular rate and rhythm, normal S1 S2, no S3 or S4, no murmur, click or rub, no peripheral edema and peripheral pulses strong  ABDOMEN: soft, nontender, no hepatosplenomegaly, no masses and bowel sounds normal  MS: no gross musculoskeletal defects noted, no edema  SKIN: no suspicious lesions or rashes  NEURO: Normal strength and tone, mentation intact and speech normal  PSYCH: mentation appears normal, affect normal/bright    ASSESSMENT/PLAN:         ICD-10-CM    1. Testicular hypofunction E29.1 testosterone cypionate (DEPOTESTOTERONE) 200 MG/ML injection   2. Chronic midline low back pain without sciatica M54.5 ibuprofen (ADVIL/MOTRIN) 800 MG tablet    G89.29        COUNSELING:  Reviewed preventive health counseling, as reflected in patient instructions       needs endoscopy and colonoscoy       Regular exercise       Healthy diet/nutrition       reports that he has never smoked. He has never used  smokeless tobacco.    Estimated body mass index is 36.48 kg/(m^2) as calculated from the following:    Height as of this encounter: 6' (1.829 m).    Weight as of this encounter: 269 lb (122 kg).   Weight management plan: decrease portion size    Counseling Resources:  ATP IV Guidelines  Pooled Cohorts Equation Calculator  FRAX Risk Assessment  ICSI Preventive Guidelines  Dietary Guidelines for Americans, 2010  USDA's MyPlate  ASA Prophylaxis  Lung CA Screening    Dar Pascual MD  [facility]

## 2017-05-22 NOTE — NURSING NOTE
Chief Complaint   Patient presents with     Physical       Initial /84  Pulse 65  Temp 98.1  F (36.7  C)  Resp 16  Ht 6' (1.829 m)  Wt 269 lb (122 kg)  SpO2 97%  BMI 36.48 kg/m2 Estimated body mass index is 36.48 kg/(m^2) as calculated from the following:    Height as of this encounter: 6' (1.829 m).    Weight as of this encounter: 269 lb (122 kg).  Medication Reconciliation: morteza Graham CMA

## 2017-05-22 NOTE — LETTER
"[address]  [address]  [facility] [address] [phone]      May 22, 2017      [facility]    Fax- [phone]    Dion Yepez  [address] [place] [address]      To Whom It May Concern:  Patient is scheduled for colonoscopy and also needs UPPER GI ENDOSCOPY FOR REFLUX. HE ALSO NOTES HE HAS NOT GOTTEN THE DIRECTIONS FOR DIET AND BOWEL PREP.     Patient Active Problem List   Diagnosis     Chronic Back Pain     Testicular hypofunction     GERD (gastroesophageal reflux disease)     HYPERLIPIDEMIA LDL GOAL <130     Anxiety     Benign hypertension     Current Outpatient Prescriptions   Medication Sig Dispense Refill     testosterone cypionate (DEPOTESTOTERONE) 200 MG/ML injection INJECT 1 ML INTRAMUSCULARLY EVERY 21 DAYS 10 mL 0     ibuprofen (ADVIL/MOTRIN) 800 MG tablet TAKE 1 TABLET BY MOUTH EVERY 8 HOURS AS NEEDED FOR PAIN 90 tablet 5     atenolol (TENORMIN) 25 MG tablet TAKE 1 TABLET BY MOUTH EVERY DAY 30 tablet 0     cyclobenzaprine (FLEXERIL) 10 MG tablet TAKE 1/2 TO 1 TABLET BY MOUTH THREE TIMES A DAY AS NEEDED FOR MUSCLE SPASMS 30 tablet 1     amLODIPine (NORVASC) 5 MG tablet TAKE 1 TABLET BY MOUTH EVERY DAY 30 tablet 1     gabapentin (NEURONTIN) 300 MG capsule TAKE 1 CAPSULE BY MOUTH THREE TIMES A DAY 90 capsule 9     ranitidine (ZANTAC) 150 MG tablet Take 1 tablet (150 mg) by mouth 2 times daily 180 tablet 3     cimetidine (TAGAMET) 300 MG tablet Take 1 tablet (300 mg) by mouth At Bedtime 90 tablet 1     Syringe/Needle, Disp, (SYRINGE LUER LOCK) 23G X 1\" 3 ML MISC 1 Syringe every 21 days 12 each 6     Blood Pressure Monitoring (ADULT BLOOD PRESSURE CUFF LG) KIT One unit 1 kit 0     omeprazole (PRILOSEC) 40 MG capsule TAKE 1 CAPSULE BY MOUTH EVERY DAY 90 capsule 0     order for DME 1 proten shake a day       SUBOXONE 8-2 MG film Place 1 Film under the tongue 3 times daily   0     B-D LUER-TONI SYRINGE 20G X 1\" 3 ML MISC        " "Syringe/Needle, Disp, 20G X 1\" 1 ML MISC Inject 1 mL into the muscle every 21 days 10 Device 11     lisinopril-hydrochlorothiazide (PRINZIDE,ZESTORETIC) 20-12.5 MG per tablet Take 1 tablet by mouth daily 90 tablet 0     [DISCONTINUED] ibuprofen (ADVIL/MOTRIN) 800 MG tablet TAKE 1 TABLET BY MOUTH EVERY 8 HOURS AS NEEDED FOR PAIN 90 tablet 0     [DISCONTINUED] testosterone cypionate (DEPOTESTOTERONE CYPIONATE) 200 MG/ML injection INJECT 1 ML INTRAMUSCULARLY EVERY 21 DAYS 10 mL 0     Please contact to confirm HAVING UPPER GI ENDOSCOPY FOR REFLUX. ALONG WITH SCREENING COLONOSCOPY, AND NEEDS THE PREP DIRECTIONS FOR DIET AND ORAL AGENTS FOR COLONOSCOPY PREP.   Thank you for caring for Mr. Yepez.     Sincerely,      Dar Pascual MD          "

## 2017-05-22 NOTE — MR AVS SNAPSHOT
After Visit Summary   5/22/2017    Dion Yepez    MRN: [id number]           Patient Information     Date Of Birth          1965        Visit Information        Provider Department      5/22/2017 1:45 PM Dar Pascual MD [facility]        Today's Diagnoses     Gastroesophageal reflux disease with esophagitis    -  1    Testicular hypofunction        Chronic midline low back pain without sciatica        Routine general medical examination at a health care facility          Care Instructions      Preventive Health Recommendations  Male Ages 50 - 64    Yearly exam:             See your health care provider every year in order to  o   Review health changes.   o   Discuss preventive care.    o   Review your medicines if your doctor has prescribed any.     Have a cholesterol test every 5 years, or more frequently if you are at risk for high cholesterol/heart disease.     Have a diabetes test (fasting glucose) every three years. If you are at risk for diabetes, you should have this test more often.     Have a colonoscopy at age 50, or have a yearly FIT test (stool test). These exams will check for colon cancer.      Talk with your health care provider about whether or not a prostate cancer screening test (PSA) is right for you.    You should be tested each year for STDs (sexually transmitted diseases), if you re at risk.     Shots: Get a flu shot each year. Get a tetanus shot every 10 years.     Nutrition:    Eat at least 5 servings of fruits and vegetables daily.     Eat whole-grain bread, whole-wheat pasta and brown rice instead of white grains and rice.     Talk to your provider about Calcium and Vitamin D.     Lifestyle    Exercise for at least 150 minutes a week (30 minutes a day, 5 days a week). This will help you control your weight and prevent disease.     Limit alcohol to one drink per day.     No smoking.     Wear sunscreen to prevent skin  cancer.     See your dentist every six months for an exam and cleaning.     See your eye doctor every 1 to 2 years.          Follow-ups after your visit        Additional Services     GASTROENTEROLOGY ADULT REF PROCEDURE ONLY       Last Lab Result: Creatinine (mg/dL)       Date                     Value                 01/23/2017               0.85             ----------  Body mass index is 36.48 kg/(m^2).     Needed:  No  Language:  English    Patient will be contacted to schedule procedure.     Please be aware that coverage of these services is subject to the terms and limitations of your health insurance plan.  Call member services at your health plan with any benefit or coverage questions.  Any procedures must be performed at a [facility] facility OR coordinated by your clinic's referral office.    Please bring the following with you to your appointment:    (1) Any X-Rays, CTs or MRIs which have been performed.  Contact the facility where they were done to arrange for  prior to your scheduled appointment.    (2) List of current medications   (3) This referral request   (4) Any documents/labs given to you for this referral                  Your next 10 appointments already scheduled     Jun 29, 2017   Procedure with Darian Mesa MD   [facility] ([facility])    [address]  [place] [address] [phone]           [facility] is located at [address] [place]              Who to contact     If you have questions or need follow up information about today's clinic visit or your schedule please contact [facility] directly at [phone].  Normal or non-critical lab and imaging results will be communicated to you by MyChart, letter or phone within 4 business days after the clinic has received the results. If you do not hear from us within 7 days, please contact the clinic through MyChart or phone.  If you have a critical or abnormal lab result, we will notify you by phone as soon as possible.  Submit refill requests through [employer] or call your pharmacy and they will forward the refill request to us. Please allow 3 business days for your refill to be completed.          Additional Information About Your Visit        [employer] Information     [employer] gives you secure access to your electronic health record. If you see a primary care provider, you can also send messages to your care team and make appointments. If you have questions, please call your primary care clinic.  If you do not have a primary care provider, please call [phone] and they will assist you.        Care EveryWhere ID     This is your Care EveryWhere ID. This could be used by other organizations to access your [place] medical records  [id number]        Your Vitals Were     Pulse Temperature Respirations Height Pulse Oximetry BMI (Body Mass Index)    65 98.1  F (36.7  C) 16 6' (1.829 m) 97% 36.48 kg/m2       Blood Pressure from Last 3 Encounters:   05/22/17 136/84   03/06/17 (!) 142/94   01/23/17 (!) 150/100    Weight from Last 3 Encounters:   05/22/17 269 lb (122 kg)   03/06/17 272 lb (123.4 kg)   01/23/17 264 lb (119.7 kg)              We Performed the Following     GASTROENTEROLOGY ADULT REF PROCEDURE ONLY          Today's Medication Changes          These changes are accurate as of: 5/22/17  4:55 PM.  If you have any questions, ask your nurse or doctor.               These medicines have changed or have updated prescriptions.        Dose/Directions    ibuprofen 800 MG tablet   Commonly known as:  ADVIL/MOTRIN   This may have changed:  See the new instructions.   Used for:  Chronic midline low back pain without sciatica   Changed by:  Dar Pascual MD        TAKE 1 TABLET BY MOUTH EVERY 8 HOURS AS NEEDED FOR PAIN   Quantity:  90 tablet   Refills:  5            Where to get your medicines      These medications were sent to  "[facility] [place], [place] - [address]  [address], [facility] [address]     Phone:  [phone]     ibuprofen 800 MG tablet         Some of these will need a paper prescription and others can be bought over the counter.  Ask your nurse if you have questions.     Bring a paper prescription for each of these medications     testosterone cypionate 200 MG/ML injection                Primary Care Provider Office Phone # Fax #    Dar Pascual [phone]       [facility] [address]  [facility] [address]        Thank you!     Thank you for choosing [facility]  for your care. Our goal is always to provide you with excellent care. Hearing back from our patients is one way we can continue to improve our services. Please take a few minutes to complete the written survey that you may receive in the mail after your visit with us. Thank you!             Your Updated Medication List - Protect others around you: Learn how to safely use, store and throw away your medicines at www.disposemymeds.org.          This list is accurate as of: 5/22/17  4:55 PM.  Always use your most recent med list.                   Brand Name Dispense Instructions for use    Adult Blood Pressure Cuff Lg Kit     1 kit    One unit       amLODIPine 5 MG tablet    NORVASC    30 tablet    TAKE 1 TABLET BY MOUTH EVERY DAY       atenolol 25 MG tablet    TENORMIN    30 tablet    TAKE 1 TABLET BY MOUTH EVERY DAY       * B-D LUER-TONI SYRINGE 20G X 1\" 3 ML Misc   Generic drug:  Syringe/Needle (Disp)          * Syringe Luer Lock 23G X 1\" 3 ML Misc     12 each    1 Syringe every 21 days       cimetidine 300 MG tablet    TAGAMET    90 tablet    Take 1 tablet (300 mg) by mouth At Bedtime       cyclobenzaprine 10 MG tablet    FLEXERIL    30 tablet    TAKE 1/2 TO 1 TABLET BY MOUTH THREE TIMES A DAY AS NEEDED FOR MUSCLE SPASMS       gabapentin " "300 MG capsule    NEURONTIN    90 capsule    TAKE 1 CAPSULE BY MOUTH THREE TIMES A DAY       ibuprofen 800 MG tablet    ADVIL/MOTRIN    90 tablet    TAKE 1 TABLET BY MOUTH EVERY 8 HOURS AS NEEDED FOR PAIN       lisinopril-hydrochlorothiazide 20-12.5 MG per tablet    PRINZIDE/ZESTORETIC    90 tablet    Take 1 tablet by mouth daily       omeprazole 40 MG capsule    priLOSEC    90 capsule    TAKE 1 CAPSULE BY MOUTH EVERY DAY       order for DME      1 proten shake a day       ranitidine 150 MG tablet    ZANTAC    180 tablet    Take 1 tablet (150 mg) by mouth 2 times daily       SUBOXONE 8-2 MG per film   Generic drug:  buprenorphine HCl-naloxone HCl      Place 1 Film under the tongue 3 times daily       Syringe/Needle (Disp) 20G X 1\" 1 ML Misc     10 Device    Inject 1 mL into the muscle every 21 days       testosterone cypionate 200 MG/ML injection    DEPOTESTOTERONE    10 mL    INJECT 1 ML INTRAMUSCULARLY EVERY 21 DAYS       * Notice:  This list has 2 medication(s) that are the same as other medications prescribed for you. Read the directions carefully, and ask your doctor or other care provider to review them with you.      "

## 2017-06-12 ENCOUNTER — DOCUMENTATION ONLY (OUTPATIENT)
Dept: CARDIOLOGY | Facility: CLINIC | Age: 52
End: 2017-06-12

## 2017-06-12 NOTE — PROGRESS NOTES
Pt loss to follow up with his ICD. Tried contacting pt via phone (6/6/17) with no response. Mailed 1 week letter today. Karina MEYER

## 2017-06-13 DIAGNOSIS — I10 BENIGN HYPERTENSION: ICD-10-CM

## 2017-06-13 DIAGNOSIS — K21.9 GASTROESOPHAGEAL REFLUX DISEASE WITHOUT ESOPHAGITIS: ICD-10-CM

## 2017-06-14 NOTE — TELEPHONE ENCOUNTER
ATENOLOL 25 MG TABLET      Last Written Prescription Date: 05/18/17  Last Fill Quantity: 30, # refills: 0    Last Office Visit with [facility], [facility] or [facility] prescribing provider:  05/22/17   Future Office Visit:        BP Readings from Last 3 Encounters:   05/22/17 136/84   03/06/17 (!) 142/94   01/23/17 (!) 150/100     OMEPRAZOLE DR 40 MG CAPSULE      Last Written Prescription Date: 01/15/17  Last Fill Quantity: 90,  # refills: 0   Last Office Visit with [facility], [facility] or [facility] prescribing provider: 05/22/17

## 2017-06-15 RX ORDER — OMEPRAZOLE 40 MG/1
CAPSULE, DELAYED RELEASE ORAL
Qty: 30 CAPSULE | Refills: 5 | Status: SHIPPED | OUTPATIENT
Start: 2017-06-15 | End: 2023-12-15

## 2017-06-15 RX ORDER — ATENOLOL 25 MG/1
TABLET ORAL
Qty: 30 TABLET | Refills: 5 | Status: SHIPPED | OUTPATIENT
Start: 2017-06-15 | End: 2017-09-20

## 2017-07-12 DIAGNOSIS — M54.30 SCIATICA, UNSPECIFIED LATERALITY: ICD-10-CM

## 2017-07-12 DIAGNOSIS — M54.50 CHRONIC MIDLINE LOW BACK PAIN WITHOUT SCIATICA: ICD-10-CM

## 2017-07-12 DIAGNOSIS — G89.29 CHRONIC MIDLINE LOW BACK PAIN WITHOUT SCIATICA: ICD-10-CM

## 2017-07-12 DIAGNOSIS — I10 ESSENTIAL HYPERTENSION: ICD-10-CM

## 2017-07-13 RX ORDER — AMLODIPINE BESYLATE 5 MG/1
TABLET ORAL
Qty: 30 TABLET | Refills: 6 | Status: SHIPPED | OUTPATIENT
Start: 2017-07-13 | End: 2017-09-20

## 2017-07-13 RX ORDER — CYCLOBENZAPRINE HCL 10 MG
TABLET ORAL
Qty: 30 TABLET | Refills: 1 | Status: SHIPPED | OUTPATIENT
Start: 2017-07-13 | End: 2017-09-20

## 2017-07-13 NOTE — TELEPHONE ENCOUNTER
AMLODIPINE BESYLATE 5 MG TAB      Last Written Prescription Date: 06/15/17  Last Fill Quantity: 30, # refills: 5  Last Office Visit with [facility], [facility] or [facility] prescribing provider: 05/22/17     Potassium   Date Value Ref Range Status   01/23/2017 4.7 3.4 - 5.3 mmol/L Final     Creatinine   Date Value Ref Range Status   01/23/2017 0.85 0.66 - 1.25 mg/dL Final     BP Readings from Last 3 Encounters:   05/22/17 136/84   03/06/17 (!) 142/94   01/23/17 (!) 150/100     CYCLOBENZAPRINE 10 MG TABLET      Last Written Prescription Date:  05/18/17  Last Fill Quantity: 30,   # refills: 1  Last Office Visit with [facility], [facility] or [facility] prescribing provider: 05/22/17  Future Office visit:       Routing refill request to provider for review/approval because:  Drug not on the [facility], [facility] or [facility] refill protocol or controlled substance

## 2017-07-13 NOTE — TELEPHONE ENCOUNTER
Controlled Substance Refill Request for cyclopenzaprine  Problem List Complete:  No     PROVIDER TO CONSIDER COMPLETION OF PROBLEM LIST AND OVERVIEW/CONTROLLED SUBSTANCE AGREEMENT    Last Written Prescription Date:  5/18/17  Last Fill Quantity: 30,   # refills: 1    [place] [facility] [place] care provider: 5/22/17    Future Office visit:     Controlled substance agreement on file: No.     Processing:  Fax Rx to : pharmacy   checked in past 6 months?  Yes Today 7/13/17  Patient is getting Suboxone from Dr Matt Collins also    Staci Mahajan RN- Triage [employer]                  Amlodipine Prescription approved per [facility] Refill Protocol.  Staci Mahajan RN- Triage [employer]

## 2017-09-08 DIAGNOSIS — M54.30 SCIATICA, UNSPECIFIED LATERALITY: ICD-10-CM

## 2017-09-08 RX ORDER — CYCLOBENZAPRINE HCL 10 MG
TABLET ORAL
Qty: 30 TABLET | Refills: 1 | OUTPATIENT
Start: 2017-09-08

## 2017-09-08 NOTE — TELEPHONE ENCOUNTER
Left voice message informing patient OV needed. He will need to establish care with a new provider since doctor Ankur is not longer [facility] [place]. He may ask for triage if further questions re: message.

## 2017-09-20 ENCOUNTER — OFFICE VISIT (OUTPATIENT)
Dept: FAMILY MEDICINE | Facility: CLINIC | Age: 52
End: 2017-09-20
Payer: COMMERCIAL

## 2017-09-20 VITALS
BODY MASS INDEX: 36.35 KG/M2 | TEMPERATURE: 98.4 F | RESPIRATION RATE: 16 BRPM | HEART RATE: 69 BPM | WEIGHT: 268 LBS | OXYGEN SATURATION: 95 % | DIASTOLIC BLOOD PRESSURE: 92 MMHG | SYSTOLIC BLOOD PRESSURE: 140 MMHG

## 2017-09-20 DIAGNOSIS — I10 BENIGN HYPERTENSION: ICD-10-CM

## 2017-09-20 DIAGNOSIS — I10 ESSENTIAL HYPERTENSION: ICD-10-CM

## 2017-09-20 DIAGNOSIS — M54.50 CHRONIC MIDLINE LOW BACK PAIN WITHOUT SCIATICA: Primary | ICD-10-CM

## 2017-09-20 DIAGNOSIS — E29.1 TESTICULAR HYPOFUNCTION: ICD-10-CM

## 2017-09-20 DIAGNOSIS — G89.29 CHRONIC MIDLINE LOW BACK PAIN WITHOUT SCIATICA: Primary | ICD-10-CM

## 2017-09-20 PROCEDURE — 36415 COLL VENOUS BLD VENIPUNCTURE: CPT | Performed by: FAMILY MEDICINE

## 2017-09-20 PROCEDURE — 99214 OFFICE O/P EST MOD 30 MIN: CPT | Performed by: FAMILY MEDICINE

## 2017-09-20 PROCEDURE — 80048 BASIC METABOLIC PNL TOTAL CA: CPT | Performed by: FAMILY MEDICINE

## 2017-09-20 RX ORDER — AMLODIPINE BESYLATE 5 MG/1
5 TABLET ORAL DAILY
Qty: 30 TABLET | Refills: 3 | Status: SHIPPED | OUTPATIENT
Start: 2017-09-20 | End: 2018-02-08

## 2017-09-20 RX ORDER — GABAPENTIN 300 MG/1
CAPSULE ORAL
Qty: 120 CAPSULE | Refills: 3 | Status: SHIPPED | OUTPATIENT
Start: 2017-09-20 | End: 2017-12-22

## 2017-09-20 RX ORDER — LISINOPRIL AND HYDROCHLOROTHIAZIDE 12.5; 2 MG/1; MG/1
1 TABLET ORAL DAILY
Qty: 90 TABLET | Refills: 0 | Status: SHIPPED | OUTPATIENT
Start: 2017-09-20 | End: 2017-12-11

## 2017-09-20 RX ORDER — CYCLOBENZAPRINE HCL 10 MG
TABLET ORAL
Qty: 30 TABLET | Refills: 1 | Status: SHIPPED | OUTPATIENT
Start: 2017-09-20 | End: 2017-11-13

## 2017-09-20 RX ORDER — METOPROLOL SUCCINATE 50 MG/1
50 TABLET, EXTENDED RELEASE ORAL DAILY
Qty: 90 TABLET | Refills: 1 | Status: SHIPPED | OUTPATIENT
Start: 2017-09-20 | End: 2018-03-12

## 2017-09-20 RX ORDER — TESTOSTERONE CYPIONATE 200 MG/ML
INJECTION, SOLUTION INTRAMUSCULAR
Qty: 10 ML | Refills: 0 | Status: SHIPPED | OUTPATIENT
Start: 2017-09-20 | End: 2018-01-23

## 2017-09-20 NOTE — PROGRESS NOTES
SUBJECTIVE:   Dion Yepez is a 52 year old male who presents to clinic today for the following health issues:    Medication Followup of Gabapentin, Testosterone, and Flexeril    Taking Medication as prescribed: yes    Side Effects:  None    Medication Helping Symptoms:  yes     Requesting increase in Gabapentin.  Would like testosterone Rx in paper form.    Hypertension Follow-up      Outpatient blood pressures are not being checked.    Low Salt Diet: low salt    Pt has been out of the Atenolol for over a week    Anxiety Follow-Up    Status since last visit: Improved. No longer taking meds for symptoms and feels stable.    Other associated symptoms:Fatigue    Complicating factors:   Significant life event: No   Current substance abuse: None  Depression symptoms: No  No flowsheet data found.    GAD7            Back Pain Follow Up      Description:   Location of pain:  Bilateral, lower  Character of pain: sharp, dull ache, burning, cramping, constant and intermittent  Pain radiation: radiates into the right buttocks and radiates into the left buttocks  Since last visit, pain is:  unchanged  New numbness or weakness in legs, not attributed to pain:  no     Intensity: Currently 4/10    History:   Pain interferes with job: YES  Therapies tried without relief: acetaminophen (Tylenol), chiropractor, cold, heat, rest and sitting  Therapies tried with relief: Gabapentin and muscle relaxants           Accompanying Signs & Symptoms:  Risk of Fracture:  None  Risk of Cauda Equina:  None  Risk of Infection:  None  Risk of Cancer:  None    Pt wants to increase the Gabapentin, not quite as much relief as in past        Amount of exercise or physical activity: None    Problems taking medications regularly: No    Medication side effects: none  Diet: low salt              Problem list and histories reviewed & adjusted, as indicated.  Additional history: as documented    Labs reviewed in EPIC    Reviewed and updated as  needed this visit by clinical staffTobacco  Allergies  Meds  Problems  Med Hx  Surg Hx  Fam Hx  Soc Hx        Reviewed and updated as needed this visit by Provider  Allergies  Meds  Problems         ROS:  CONSTITUTIONAL:NEGATIVE for fever, chills, change in weight  RESP:NEGATIVE for significant cough or SOB  CV: NEGATIVE for chest pain, palpitations or peripheral edema  MUSCULOSKELETAL: POSITIVE  for back pain low back  PSYCHIATRIC: POSITIVE foranxiety and Hx anxiety    OBJECTIVE:                                                    BP (!) 140/92  Pulse 69  Temp 98.4  F (36.9  C) (Tympanic)  Resp 16  Wt 268 lb (121.6 kg)  SpO2 95%  BMI 36.35 kg/m2  Body mass index is 36.35 kg/(m^2).  GENERAL APPEARANCE: healthy, alert and no distress  NECK: no adenopathy, no asymmetry, masses, or scars, thyroid normal to palpation and no bruits  RESP: lungs clear to auscultation - no rales, rhonchi or wheezes  CV: regular rates and rhythm, normal S1 S2, no S3 or S4 and no murmur, click or rub  MS: extremities normal- no gross deformities noted  ORTHO: Lumber/Thoracic Spine Exam: Tender:  left para lumbar muscles, right para lumbar muscles  Non-tender:  lumbar facet joints  Range of Motion:  lumbar flexion  full, lumbar extension  full, left lateral lumbar bending  full, right lateral lumbar bending  full, left lateral lumbar rotation  full, right lateral lumbar rotation  full  Strength:  able to heel walk  Special tests:  negative straight leg raises    Hip Exam: not examined      PSYCH: mentation appears normal and affect normal/bright    Diagnostic test results:  Lab: see below, results pending       ASSESSMENT/PLAN:                                                        ICD-10-CM    1. Chronic midline low back pain without sciatica M54.5 amLODIPine (NORVASC) 5 MG tablet    G89.29 gabapentin (NEURONTIN) 300 MG capsule     cyclobenzaprine (FLEXERIL) 10 MG tablet   2. Essential hypertension I10 amLODIPine (NORVASC) 5  MG tablet   3. Benign hypertension I10 metoprolol (TOPROL-XL) 50 MG 24 hr tablet     lisinopril-hydrochlorothiazide (PRINZIDE/ZESTORETIC) 20-12.5 MG per tablet     Basic metabolic panel   4. Testicular hypofunction E29.1 testosterone cypionate (DEPOTESTOTERONE) 200 MG/ML injection       Follow up with Provider - 3 mo    Patient Instructions   Alternate ice & heat.  Use Ice massage on trigger point areas.  Do gentle Range of motion exercises      Rainer Castillo MD  [facility]

## 2017-09-20 NOTE — MR AVS SNAPSHOT
After Visit Summary   9/20/2017    Dion Yepez    MRN: [id number]           Patient Information     Date Of Birth          1965        Visit Information        Provider Department      9/20/2017 1:30 PM Rainer Castillo MD [facility]        Today's Diagnoses     Sciatica, unspecified laterality    -  1    Benign hypertension        Essential hypertension        Chronic midline low back pain without sciatica          Care Instructions    Alternate ice & heat.  Use Ice massage on trigger point areas.  Do gentle Range of motion exercises          Follow-ups after your visit        Who to contact     If you have questions or need follow up information about today's clinic visit or your schedule please contact [facility] directly at [phone].  Normal or non-critical lab and imaging results will be communicated to you by HackHandshart, letter or phone within 4 business days after the clinic has received the results. If you do not hear from us within 7 days, please contact the clinic through HackHandshart or phone. If you have a critical or abnormal lab result, we will notify you by phone as soon as possible.  Submit refill requests through FireID or call your pharmacy and they will forward the refill request to us. Please allow 3 business days for your refill to be completed.          Additional Information About Your Visit        MyChart Information     FireID gives you secure access to your electronic health record. If you see a primary care provider, you can also send messages to your care team and make appointments. If you have questions, please call your primary care clinic.  If you do not have a primary care provider, please call [phone] and they will assist you.        Care EveryWhere ID     This is your Care EveryWhere ID. This could be used by other organizations to access your [facility] medical records  [id number]         Your Vitals Were     Pulse Temperature Respirations Pulse Oximetry BMI (Body Mass Index)       69 98.4  F (36.9  C) (Tympanic) 16 95% 36.35 kg/m2        Blood Pressure from Last 3 Encounters:   09/20/17 (!) 140/92   05/22/17 136/84   03/06/17 (!) 142/94    Weight from Last 3 Encounters:   09/20/17 268 lb (121.6 kg)   05/22/17 269 lb (122 kg)   03/06/17 272 lb (123.4 kg)              We Performed the Following     Basic metabolic panel          Today's Medication Changes          These changes are accurate as of: 9/20/17  2:00 PM.  If you have any questions, ask your nurse or doctor.               Start taking these medicines.        Dose/Directions    metoprolol 50 MG 24 hr tablet   Commonly known as:  TOPROL-XL   Used for:  Benign hypertension   Started by:  Rainer Castillo MD        Dose:  50 mg   Take 1 tablet (50 mg) by mouth daily   Quantity:  90 tablet   Refills:  1         These medicines have changed or have updated prescriptions.        Dose/Directions    amLODIPine 5 MG tablet   Commonly known as:  NORVASC   This may have changed:  See the new instructions.   Used for:  Essential hypertension, Chronic midline low back pain without sciatica   Changed by:  Rainer Castillo MD        Dose:  5 mg   Take 1 tablet (5 mg) by mouth daily   Quantity:  30 tablet   Refills:  3       cyclobenzaprine 10 MG tablet   Commonly known as:  FLEXERIL   This may have changed:  See the new instructions.   Used for:  Sciatica, unspecified laterality   Changed by:  Rainer Castillo MD        TAKE 1/2 TO 1 TABLET BY MOUTH THREE TIMES A DAY AS NEEDED FOR MUSCLE SPASMS   Quantity:  30 tablet   Refills:  1       gabapentin 300 MG capsule   Commonly known as:  NEURONTIN   This may have changed:  additional instructions   Used for:  Chronic midline low back pain without sciatica   Changed by:  Rainer Castillo MD        TAKE 1 CAPSULE BY MOUTH twice daily and take 2 at bedtime   Quantity:  120 capsule   Refills:  3             Where to get your medicines      These medications were sent to [facility] - [place], [place] - [address]  [address] [facility] [address], [facility] [address]     Phone:  [phone]     amLODIPine 5 MG tablet    cyclobenzaprine 10 MG tablet    gabapentin 300 MG capsule    lisinopril-hydrochlorothiazide 20-12.5 MG per tablet    metoprolol 50 MG 24 hr tablet                Primary Care Provider    None       No address on file        Equal Access to Services     TONO ROMO : Hadii aad ku hadasho Soomaali, waaxda luqadaha, qaybta kaalmada adeegyada, waxay idiin hayaan adeeg khalejandrohoward lakhris . So [facility] [phone].    ATENCIÓN: Si habla español, tiene a alexander disposición servicios gratuitos de asistencia lingüística. [facility] [phone].    We comply with applicable federal civil rights laws and [place] laws. We do not discriminate on the basis of race, color, national origin, age, disability sex, sexual orientation or gender identity.            Thank you!     Thank you for choosing [facility]  for your care. Our goal is always to provide you with excellent care. Hearing back from our patients is one way we can continue to improve our services. Please take a few minutes to complete the written survey that you may receive in the mail after your visit with us. Thank you!             Your Updated Medication List - Protect others around you: Learn how to safely use, store and throw away your medicines at www.disposemymeds.org.          This list is accurate as of: 9/20/17  2:00 PM.  Always use your most recent med list.                   Brand Name Dispense Instructions for use Diagnosis    Adult Blood Pressure Cuff Lg Kit     1 kit    One unit    Essential hypertension       amLODIPine 5 MG tablet    NORVASC    30 tablet    Take 1 tablet (5 mg) by mouth daily    Essential hypertension, Chronic midline low back pain without sciatica       * B-D LUER-OTNI  "SYRINGE 20G X 1\" 3 ML Misc   Generic drug:  Syringe/Needle (Disp)           * Syringe Luer Lock 23G X 1\" 3 ML Misc     12 each    1 Syringe every 21 days    Testicular hypofunction       cimetidine 300 MG tablet    TAGAMET    90 tablet    Take 1 tablet (300 mg) by mouth At Bedtime    Chronic gastritis without bleeding, unspecified gastritis type       cyclobenzaprine 10 MG tablet    FLEXERIL    30 tablet    TAKE 1/2 TO 1 TABLET BY MOUTH THREE TIMES A DAY AS NEEDED FOR MUSCLE SPASMS    Sciatica, unspecified laterality       gabapentin 300 MG capsule    NEURONTIN    120 capsule    TAKE 1 CAPSULE BY MOUTH twice daily and take 2 at bedtime    Chronic midline low back pain without sciatica       ibuprofen 800 MG tablet    ADVIL/MOTRIN    90 tablet    TAKE 1 TABLET BY MOUTH EVERY 8 HOURS AS NEEDED FOR PAIN    Chronic midline low back pain without sciatica       lisinopril-hydrochlorothiazide 20-12.5 MG per tablet    PRINZIDE/ZESTORETIC    90 tablet    Take 1 tablet by mouth daily    Benign hypertension       metoprolol 50 MG 24 hr tablet    TOPROL-XL    90 tablet    Take 1 tablet (50 mg) by mouth daily    Benign hypertension       omeprazole 40 MG capsule    priLOSEC    30 capsule    TAKE 1 CAPSULE BY MOUTH EVERY DAY    Gastroesophageal reflux disease without esophagitis       order for DME      1 proten shake a day        ranitidine 150 MG tablet    ZANTAC    180 tablet    Take 1 tablet (150 mg) by mouth 2 times daily    Chronic gastritis without bleeding, unspecified gastritis type       SUBOXONE 8-2 MG per film   Generic drug:  buprenorphine HCl-naloxone HCl      Place 1 Film under the tongue 3 times daily        Syringe/Needle (Disp) 20G X 1\" 1 ML Misc     10 Device    Inject 1 mL into the muscle every 21 days    Hypogonadism male       testosterone cypionate 200 MG/ML injection    DEPOTESTOTERONE    10 mL    INJECT 1 ML INTRAMUSCULARLY EVERY 21 DAYS    Testicular hypofunction       * Notice:  This list has 2 " medication(s) that are the same as other medications prescribed for you. Read the directions carefully, and ask your doctor or other care provider to review them with you.

## 2017-09-20 NOTE — NURSING NOTE
Chief Complaint   Patient presents with     Recheck Medication     Hypertension     Anxiety       Initial BP (!) 140/96 (BP Location: Left arm, Patient Position: Chair, Cuff Size: Adult Large)  Pulse 69  Temp 98.4  F (36.9  C) (Tympanic)  Resp 16  Wt 268 lb (121.6 kg)  SpO2 95%  BMI 36.35 kg/m2 Estimated body mass index is 36.35 kg/(m^2) as calculated from the following:    Height as of 5/22/17: 6' (1.829 m).    Weight as of this encounter: 268 lb (121.6 kg).  Medication Reconciliation: complete     Princess FRANCISCO Caicedo, CMA

## 2017-09-21 LAB
ANION GAP SERPL CALCULATED.3IONS-SCNC: 6 MMOL/L (ref 3–14)
BUN SERPL-MCNC: 11 MG/DL (ref 7–30)
CALCIUM SERPL-MCNC: 9.2 MG/DL (ref 8.5–10.1)
CHLORIDE SERPL-SCNC: 100 MMOL/L (ref 94–109)
CO2 SERPL-SCNC: 31 MMOL/L (ref 20–32)
CREAT SERPL-MCNC: 1.02 MG/DL (ref 0.66–1.25)
GFR SERPL CREATININE-BSD FRML MDRD: 77 ML/MIN/1.7M2
GLUCOSE SERPL-MCNC: 97 MG/DL (ref 70–99)
POTASSIUM SERPL-SCNC: 4.7 MMOL/L (ref 3.4–5.3)
SODIUM SERPL-SCNC: 137 MMOL/L (ref 133–144)

## 2017-11-13 DIAGNOSIS — M54.50 CHRONIC MIDLINE LOW BACK PAIN WITHOUT SCIATICA: ICD-10-CM

## 2017-11-13 DIAGNOSIS — G89.29 CHRONIC MIDLINE LOW BACK PAIN WITHOUT SCIATICA: ICD-10-CM

## 2017-11-14 NOTE — TELEPHONE ENCOUNTER
LOV 9/20/17    Requested Prescriptions   Pending Prescriptions Disp Refills     ibuprofen (ADVIL/MOTRIN) 800 MG tablet [Pharmacy Med Name: IBUPROFEN 800 MG TABLET] 90 tablet 5     Sig: TAKE 1 TABLET BY MOUTH EVERY 8 HOURS AS NEEDED FOR PAIN    NSAID Medications Failed    11/13/2017 12:53 PM       Failed - Blood pressure under 140/90    BP Readings from Last 3 Encounters:   09/20/17 (!) 140/92   05/22/17 136/84   03/06/17 (!) 142/94                Failed - Normal ALT on file in past 12 months    Recent Labs   Lab Test  02/12/16   1304   ALT  58            Failed - Normal AST on file in past 12 months    Recent Labs   Lab Test  02/12/16   1304   AST  48*            Failed - Normal CBC on file in past 12 months    Recent Labs   Lab Test  01/23/17   1126   WBC  6.2   RBC  5.45   HGB  15.6   HCT  42.8   PLT  239            Passed - Recent or future visit with authorizing provider's specialty    Patient had office visit in the last year or has a visit in the next 30 days with authorizing provider.  See chart review.              Passed - Patient is age 6-64 years       Passed - Normal serum creatinine on file in past 12 months    Recent Labs   Lab Test  09/20/17   1401   CR  1.02

## 2017-11-15 RX ORDER — IBUPROFEN 800 MG/1
TABLET, FILM COATED ORAL
Qty: 90 TABLET | Refills: 9 | Status: SHIPPED | OUTPATIENT
Start: 2017-11-15 | End: 2018-08-29

## 2017-12-11 DIAGNOSIS — I10 BENIGN HYPERTENSION: ICD-10-CM

## 2017-12-12 RX ORDER — LISINOPRIL 20 MG/1
TABLET ORAL
Qty: 30 TABLET | Refills: 3 | Status: SHIPPED | OUTPATIENT
Start: 2017-12-12 | End: 2024-01-22

## 2017-12-12 NOTE — TELEPHONE ENCOUNTER
Requested Prescriptions   Pending Prescriptions Disp Refills     lisinopril (PRINIVIL/ZESTRIL) 20 MG tablet [Pharmacy Med Name: LISINOPRIL 20 MG TABLET] 30 tablet 0     Sig: TAKE 1 TABLET BY MOUTH DAILY    ACE Inhibitors (Including Combos) Protocol Failed    12/11/2017  1:50 PM       Failed - Blood pressure under 140/90    BP Readings from Last 3 Encounters:   09/20/17 (!) 140/92   05/22/17 136/84   03/06/17 (!) 142/94                Passed - Recent or future visit with authorizing provider's specialty    Patient had office visit in the last year or has a visit in the next 30 days with authorizing provider.  See chart review.              Passed - Patient is age 18 or older       Passed - Normal serum creatinine on file in past 12 months    Recent Labs   Lab Test  09/20/17   1401   CR  1.02            Passed - Normal serum potassium on file in past 12 months    Recent Labs   Lab Test  09/20/17   1401   POTASSIUM  4.7             Prescription approved per [facility] Refill Protocol.

## 2017-12-22 DIAGNOSIS — G89.29 CHRONIC MIDLINE LOW BACK PAIN WITHOUT SCIATICA: ICD-10-CM

## 2017-12-22 DIAGNOSIS — M54.50 CHRONIC MIDLINE LOW BACK PAIN WITHOUT SCIATICA: ICD-10-CM

## 2017-12-22 RX ORDER — GABAPENTIN 300 MG/1
CAPSULE ORAL
Qty: 120 CAPSULE | Refills: 3 | Status: SHIPPED | OUTPATIENT
Start: 2017-12-22 | End: 2019-06-12

## 2017-12-22 NOTE — TELEPHONE ENCOUNTER
gabapentin (NEURONTIN) 300 MG capsule    PATIENT NEEDS TODAY  Last Written Prescription Date:  9/20/17  Last Fill Quantity: 120,   # refills: 0  Last Office Visit: 9/20/17  Future Office visit:       Routing refill request to provider for review/approval because:  Drug not on the [facility], [facility] or [facility] refill protocol or controlled substance    Staci Mahajan RN- Triage [employer]

## 2017-12-22 NOTE — TELEPHONE ENCOUNTER
Reason for Call:  Medication or medication refill:    Do you use a [facility]?  Name of the pharmacy and phone number for the current request:  [facility] [address]    Name of the medication requested: Gabapentin 300 mg    Other request: Please send to pharmacy. Patient is out of medication and is going on vacation next week. Needs to  today. If any problems please call patient    Can we leave a detailed message on this number? YES    Phone number patient can be reached at: Home number on file [phone] (home)    Best Time: any    Call taken on 12/22/2017 at 11:31 AM by ANGEL WALKER

## 2018-01-17 ENCOUNTER — ALLIED HEALTH/NURSE VISIT (OUTPATIENT)
Dept: CARDIOLOGY | Facility: CLINIC | Age: 53
End: 2018-01-17
Payer: COMMERCIAL

## 2018-01-17 ENCOUNTER — OFFICE VISIT (OUTPATIENT)
Dept: CARDIOLOGY | Facility: CLINIC | Age: 53
End: 2018-01-17
Payer: COMMERCIAL

## 2018-01-17 VITALS
HEIGHT: 72 IN | BODY MASS INDEX: 35.49 KG/M2 | HEART RATE: 74 BPM | SYSTOLIC BLOOD PRESSURE: 136 MMHG | WEIGHT: 262 LBS | DIASTOLIC BLOOD PRESSURE: 90 MMHG

## 2018-01-17 DIAGNOSIS — Z95.810 ICD (IMPLANTABLE CARDIOVERTER-DEFIBRILLATOR) IN PLACE: Primary | ICD-10-CM

## 2018-01-17 DIAGNOSIS — I45.81 LONG Q-T SYNDROME: ICD-10-CM

## 2018-01-17 DIAGNOSIS — I10 BENIGN HYPERTENSION: Primary | ICD-10-CM

## 2018-01-17 PROCEDURE — 93000 ELECTROCARDIOGRAM COMPLETE: CPT | Performed by: INTERNAL MEDICINE

## 2018-01-17 PROCEDURE — 93283 PRGRMG EVAL IMPLANTABLE DFB: CPT | Performed by: INTERNAL MEDICINE

## 2018-01-17 PROCEDURE — 99213 OFFICE O/P EST LOW 20 MIN: CPT | Mod: 25 | Performed by: INTERNAL MEDICINE

## 2018-01-17 NOTE — MR AVS SNAPSHOT
After Visit Summary   1/17/2018    Dion Yepez    MRN: [id number]           Patient Information     Date Of Birth          1965        Visit Information        Provider Department      1/17/2018 2:15 PM Jake Dill MD [facility]        Today's Diagnoses     Benign hypertension    -  1    Long Q-T syndrome           Follow-ups after your visit        Additional Services     Follow-[facility] with [facility]                 Your next 10 appointments already scheduled     Feb 08, 2018 11:00 AM CST   Genetic Counseling with Lola Paige GC   [facility] ([facility])    [address]  [facility] [address] [phone]            Apr 04, 2018  1:45 PM CDT   ICD Check with FRIDA OVERTON   [facility] ([facility])    [id number]  [facility] [address] [phone]              Future tests that were ordered for you today     Open Future Orders        Priority Expected Expires Ordered    Follow-Up with [facility] Routine 1/31/2018 1/17/2019 1/17/2018            Who to contact     If you have questions or need follow up information about today's clinic visit or your schedule please contact [facility] directly at [phone].  Normal or non-critical lab and imaging results will be communicated to you by MyChart, letter or phone within 4 business days after the clinic has received the results. If you do not hear from us within 7 days, please contact the clinic through MyChart or phone. If you have a critical or abnormal lab result, we will notify you by phone as soon as possible.  Submit refill requests through LocalSense or call your pharmacy and they will forward the refill request to us. Please allow 3 business days for your refill to be completed.           "Additional Information About Your Visit        MyChart Information     CompassMD lets you send messages to your doctor, view your test results, renew your prescriptions, schedule appointments and more. To sign up, go to [email or web address] . Click on \"Log in\" on the left side of the screen, which will take you to the Welcome page. Then click on \"Sign up Now\" on the right side of the page.     You will be asked to enter the access code listed below, as well as some personal information. Please follow the directions to create your username and password.     Your access code is: [email or web address]  Expires: 2018  2:25 PM     Your access code will  in 90 days. If you need help or a new code, please call your [facility] clinic or [phone].        Care EveryWhere ID     This is your Care EveryWhere ID. This could be used by other organizations to access your [facility] medical records  [id number]        Your Vitals Were     Pulse Height BMI (Body Mass Index)             74 1.829 m (6') 35.53 kg/m2          Blood Pressure from Last 3 Encounters:   18 136/90   17 (!) 140/92   17 136/84    Weight from Last 3 Encounters:   18 118.8 kg (262 lb)   17 121.6 kg (268 lb)   17 122 kg (269 lb)              We Performed the Following     EKG 12-lead complete w/read - Clinics (performed today)        Primary Care Provider Office Phone # Fax #    Rainer Castillo [phone] [address] [facility] [address]        Equal Access to Services     [facility] HospitalFRANCISCO : Hadii lawanda saab Somorales, waaxda luqadaha, qaybta kaalmada ryley, kate perez. So [facility] [phone].    ATENCIÓN: Si habla español, tiene a alexander disposición servicios gratuitos de asistencia lingüística. Llame al [phone].    We comply with applicable federal civil rights laws and [place] laws. We do not discriminate on the basis of race, color, national origin, age, " "disability, sex, sexual orientation, or gender identity.            Thank you!     Thank you for choosing [facility]  for your care. Our goal is always to provide you with excellent care. Hearing back from our patients is one way we can continue to improve our services. Please take a few minutes to complete the written survey that you may receive in the mail after your visit with us. Thank you!             Your Updated Medication List - Protect others around you: Learn how to safely use, store and throw away your medicines at www.disposemymeds.org.          This list is accurate as of: 1/17/18  2:29 PM.  Always use your most recent med list.                   Brand Name Dispense Instructions for use Diagnosis    Adult Blood Pressure Cuff Lg Kit     1 kit    One unit    Essential hypertension       amLODIPine 5 MG tablet    NORVASC    30 tablet    Take 1 tablet (5 mg) by mouth daily    Essential hypertension, Chronic midline low back pain without sciatica       * B-D LUER-TONI SYRINGE 20G X 1\" 3 ML Misc   Generic drug:  Syringe/Needle (Disp)           * Syringe Luer Lock 23G X 1\" 3 ML Misc     12 each    1 Syringe every 21 days    Testicular hypofunction       cimetidine 300 MG tablet    TAGAMET    90 tablet    Take 1 tablet (300 mg) by mouth At Bedtime    Chronic gastritis without bleeding, unspecified gastritis type       cyclobenzaprine 10 MG tablet    FLEXERIL    30 tablet    TAKE 1/2-1 TABLET BY MOUTH THREE TIMES A DAY AS NEEDED FOR MUSCLE SPASMS    Chronic midline low back pain without sciatica       gabapentin 300 MG capsule    NEURONTIN    120 capsule    TAKE 1 CAPSULE BY MOUTH twice daily and take 2 at bedtime    Chronic midline low back pain without sciatica       ibuprofen 800 MG tablet    ADVIL/MOTRIN    90 tablet    TAKE 1 TABLET BY MOUTH EVERY 8 HOURS AS NEEDED FOR PAIN    Chronic midline low back pain without sciatica       lisinopril 20 MG tablet    " "PRINIVIL/ZESTRIL    30 tablet    TAKE 1 TABLET BY MOUTH DAILY    Benign hypertension       metoprolol succinate 50 MG 24 hr tablet    TOPROL-XL    90 tablet    Take 1 tablet (50 mg) by mouth daily    Benign hypertension       omeprazole 40 MG capsule    priLOSEC    30 capsule    TAKE 1 CAPSULE BY MOUTH EVERY DAY    Gastroesophageal reflux disease without esophagitis       order for DME      1 proten shake a day        ranitidine 150 MG tablet    ZANTAC    180 tablet    Take 1 tablet (150 mg) by mouth 2 times daily    Chronic gastritis without bleeding, unspecified gastritis type       SUBOXONE 8-2 MG per film   Generic drug:  buprenorphine HCl-naloxone HCl      Place 1 Film under the tongue 3 times daily        Syringe/Needle (Disp) 20G X 1\" 1 ML Misc     10 Device    Inject 1 mL into the muscle every 21 days    Hypogonadism male       testosterone cypionate 200 MG/ML injection    DEPOTESTOTERONE    10 mL    INJECT 1 ML INTRAMUSCULARLY EVERY 21 DAYS    Testicular hypofunction       * Notice:  This list has 2 medication(s) that are the same as other medications prescribed for you. Read the directions carefully, and ask your doctor or other care provider to review them with you.      "

## 2018-01-17 NOTE — MR AVS SNAPSHOT
"              After Visit Summary   1/17/2018    Dion Yepez    MRN: [id number]           Patient Information     Date Of Birth          1965        Visit Information        Provider Department      1/17/2018 2:00 PM FRIDA DILLON [facility]        Today's Diagnoses     ICD (implantable cardioverter-defibrillator) in place    -  1       Follow-ups after your visit        Your next 10 appointments already scheduled     Apr 04, 2018  1:45 PM CDT   ICD Check with FRIDA OVERTON   [facility] ([facility])    [address] [place] [address] [id number] [phone]              Future tests that were ordered for you today     Open Future Orders        Priority Expected Expires Ordered    Follow-Up with [facility] Routine 1/31/2018 1/17/2019 1/17/2018            Who to contact     If you have questions or need follow up information about today's clinic visit or your schedule please contact [facility] directly at [phone].  Normal or non-critical lab and imaging results will be communicated to you by MessageBunkerhart, letter or phone within 4 business days after the clinic has received the results. If you do not hear from us within 7 days, please contact the clinic through [email or web address] or phone. If you have a critical or abnormal lab result, we will notify you by phone as soon as possible.  Submit refill requests through Buzz All Stars or call your pharmacy and they will forward the refill request to us. Please allow 3 business days for your refill to be completed.          Additional Information About Your Visit        MessageBunkerharPayment plugin Information     Buzz All Stars lets you send messages to your doctor, view your test results, renew your prescriptions, schedule appointments and more. To sign up, go to [email or web address] . Click on \"Log in\" on the left side of the screen, which will take you to the " "Welcome page. Then click on \"Sign up Now\" on the right side of the page.     You will be asked to enter the access code listed below, as well as some personal information. Please follow the directions to create your username and password.     Your access code is: [email or web address]  Expires: 2018  2:25 PM     Your access code will  in 90 days. If you need help or a new code, please call your [facility] clinic or [phone].        Care EveryWhere ID     This is your Care EveryWhere ID. This could be used by other organizations to access your [facility] medical records  [id number]         Blood Pressure from Last 3 Encounters:   18 136/90   17 (!) 140/92   17 136/84    Weight from Last 3 Encounters:   18 118.8 kg (262 lb)   17 121.6 kg (268 lb)   17 122 kg (269 lb)              We Performed the Following     ICD DEVICE PROGRAMMING EVAL, DUAL LEAD ICD (39381)        Primary Care Provider Office Phone # Fax #    Rainer Castillo [phone] [address] [facility] [address]        Equal Access to Services     TONO ROMO : Hadii aad ku hadasho Soomaali, waaxda luqadaha, qaybta kaalmada adeegyada, waxay idiin haybaltazar perez. So [facility] [phone].    ATENCIÓN: Si habla español, tiene a alexander disposición servicios gratuitos de asistencia lingüística. Llame al [phone].    We comply with applicable federal civil rights laws and [place] laws. We do not discriminate on the basis of race, color, national origin, age, disability, sex, sexual orientation, or gender identity.            Thank you!     Thank you for choosing [facility]  for your care. Our goal is always to provide you with excellent care. Hearing back from our patients is one way we can continue to improve our services. Please take a few minutes to complete the written survey that you may receive in the mail after your visit with us. Thank " "you!             Your Updated Medication List - Protect others around you: Learn how to safely use, store and throw away your medicines at www.disposemymeds.org.          This list is accurate as of: 1/17/18  2:25 PM.  Always use your most recent med list.                   Brand Name Dispense Instructions for use Diagnosis    Adult Blood Pressure Cuff Lg Kit     1 kit    One unit    Essential hypertension       amLODIPine 5 MG tablet    NORVASC    30 tablet    Take 1 tablet (5 mg) by mouth daily    Essential hypertension, Chronic midline low back pain without sciatica       * B-D LUER-TONI SYRINGE 20G X 1\" 3 ML Misc   Generic drug:  Syringe/Needle (Disp)           * Syringe Luer Lock 23G X 1\" 3 ML Misc     12 each    1 Syringe every 21 days    Testicular hypofunction       cimetidine 300 MG tablet    TAGAMET    90 tablet    Take 1 tablet (300 mg) by mouth At Bedtime    Chronic gastritis without bleeding, unspecified gastritis type       cyclobenzaprine 10 MG tablet    FLEXERIL    30 tablet    TAKE 1/2-1 TABLET BY MOUTH THREE TIMES A DAY AS NEEDED FOR MUSCLE SPASMS    Chronic midline low back pain without sciatica       gabapentin 300 MG capsule    NEURONTIN    120 capsule    TAKE 1 CAPSULE BY MOUTH twice daily and take 2 at bedtime    Chronic midline low back pain without sciatica       ibuprofen 800 MG tablet    ADVIL/MOTRIN    90 tablet    TAKE 1 TABLET BY MOUTH EVERY 8 HOURS AS NEEDED FOR PAIN    Chronic midline low back pain without sciatica       lisinopril 20 MG tablet    PRINIVIL/ZESTRIL    30 tablet    TAKE 1 TABLET BY MOUTH DAILY    Benign hypertension       metoprolol succinate 50 MG 24 hr tablet    TOPROL-XL    90 tablet    Take 1 tablet (50 mg) by mouth daily    Benign hypertension       omeprazole 40 MG capsule    priLOSEC    30 capsule    TAKE 1 CAPSULE BY MOUTH EVERY DAY    Gastroesophageal reflux disease without esophagitis       order for DME      1 proten shake a day        ranitidine 150 MG tablet " "   ZANTAC    180 tablet    Take 1 tablet (150 mg) by mouth 2 times daily    Chronic gastritis without bleeding, unspecified gastritis type       SUBOXONE 8-2 MG per film   Generic drug:  buprenorphine HCl-naloxone HCl      Place 1 Film under the tongue 3 times daily        Syringe/Needle (Disp) 20G X 1\" 1 ML Misc     10 Device    Inject 1 mL into the muscle every 21 days    Hypogonadism male       testosterone cypionate 200 MG/ML injection    DEPOTESTOTERONE    10 mL    INJECT 1 ML INTRAMUSCULARLY EVERY 21 DAYS    Testicular hypofunction       * Notice:  This list has 2 medication(s) that are the same as other medications prescribed for you. Read the directions carefully, and ask your doctor or other care provider to review them with you.      "

## 2018-01-17 NOTE — PROGRESS NOTES
Medtronic Secura ICD Device Check  AP: 76 % : 0 %  Mode: MVP 55        Underlying Rhythm: Sinus Kiet  Heart Rate: Stable with adequate variability  Sensing: Stable    Pacing Threshold: Stable    Impedance: Stable  Battery Status: 2.64 V-near PARESH at 2.63 V-practiced PARESH alert tones with pt and he will call us non-emergently if tones alert.  Device Site: Without signs of infection.  Atrial Arrhythmia: One episode detected and logged 2/2017 as mode switch, lasting 3 minutes. EGM shows noise on RA and shock channel. RV channel not available for review. Unable to reproduce noise during isometric exercises.  Ventricular Arrhythmia: None  ATP: None    Shocks: None  Setting Change: None    Care Plan: Pt has no complaints. Seeing Dr. Dill today. Next office threshold scheduled for April. SELENA Hough RN.
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
## 2018-01-17 NOTE — LETTER
1/17/2018      Rainer Castillo MD  [address]  [facility] [address]      RE: Dion Yepez       Dear Colleague,    I had the pleasure of seeing Dion Yepez in the [facility].    HISTORY OF PRESENT ILLNESS:  Thank you for allowing me to participate in the care of your delightful patient.  As you know, Ed is a 52-year-old gentleman with a history of presumably acquired long QT syndrome whom I had the pleasure of meeting for the first time back in 2011.  As you may recall, at the time the patient was on methadone for his chronic pain syndrome and subsequently developed a very long QT interval measured at greater than 630 milliseconds associated with polymorphic VT.  His methadone was subsequently discontinued, resulting in shortening of his QT interval.  Unfortunately, a week later, the patient presented with palpitations and near-syncope and was evaluated by Dr. Pimentel, electrophysiologist at [facility] [place] at that [place].  He was recommended to have a dual-chamber ICD implantation given his high disposition for QT prolongation.  The patient subsequently transferred his care to us since then and has been doing quite well without any evidence of any recurrent polymorphic VT.  His QTc has been normalized.  He is on a beta blocker along with lisinopril for his hypertension.  I last saw him about 2 years ago.      His device was checked today and shows appropriate function.  There is no evidence of any ventricular arrhythmias.  His device is almost at PARESH.  When I saw him last, I encouraged the patient to go to [facility] to obtain genetic testing, as it was unclear whether the patient actually had acquired long QT or a congenital predisposition, and this clinic would be the best way to sort it out.  This does have implications for his adult children.  I reemphasized that point again, and Ed for some reason thought that his children need to be  "testing, but I told him that if he should be the one to be tested first and then we have an idea what to look for in his children.  His children, otherwise, he denies any near-syncope or syncope.      Ed otherwise is doing quite well.  We will remind him when his device needs to be replaced, which I would recommend for given that the uncertainty surrounding his polymorphic VT in the past and therefore still puts him at risk for having it again.     Outpatient Encounter Prescriptions as of 1/17/2018   Medication Sig Dispense Refill     cyclobenzaprine (FLEXERIL) 10 MG tablet TAKE 1/2-1 TABLET BY MOUTH THREE TIMES A DAY AS NEEDED FOR MUSCLE SPASMS 30 tablet 1     gabapentin (NEURONTIN) 300 MG capsule TAKE 1 CAPSULE BY MOUTH twice daily and take 2 at bedtime 120 capsule 3     lisinopril (PRINIVIL/ZESTRIL) 20 MG tablet TAKE 1 TABLET BY MOUTH DAILY 30 tablet 3     ibuprofen (ADVIL/MOTRIN) 800 MG tablet TAKE 1 TABLET BY MOUTH EVERY 8 HOURS AS NEEDED FOR PAIN 90 tablet 9     metoprolol (TOPROL-XL) 50 MG 24 hr tablet Take 1 tablet (50 mg) by mouth daily 90 tablet 1     amLODIPine (NORVASC) 5 MG tablet Take 1 tablet (5 mg) by mouth daily 30 tablet 3     testosterone cypionate (DEPOTESTOTERONE) 200 MG/ML injection INJECT 1 ML INTRAMUSCULARLY EVERY 21 DAYS 10 mL 0     omeprazole (PRILOSEC) 40 MG capsule TAKE 1 CAPSULE BY MOUTH EVERY DAY 30 capsule 5     ranitidine (ZANTAC) 150 MG tablet Take 1 tablet (150 mg) by mouth 2 times daily 180 tablet 3     cimetidine (TAGAMET) 300 MG tablet Take 1 tablet (300 mg) by mouth At Bedtime 90 tablet 1     Syringe/Needle, Disp, (SYRINGE LUER LOCK) 23G X 1\" 3 ML MISC 1 Syringe every 21 days 12 each 6     Blood Pressure Monitoring (ADULT BLOOD PRESSURE CUFF LG) KIT One unit 1 kit 0     order for DME 1 proten shake a day       SUBOXONE 8-2 MG film Place 1 Film under the tongue 3 times daily   0     B-D LUER-TONI SYRINGE 20G X 1\" 3 ML MISC        Syringe/Needle, Disp, 20G X 1\" 1 ML MISC Inject 1 " mL into the muscle every 21 days 10 Device 11     No facility-administered encounter medications on file as of 1/17/2018.      Again, thank you for allowing me to participate in the care of your patient.      Sincerely,    Jake Burgos MD     [facility]

## 2018-01-18 NOTE — PROGRESS NOTES
HISTORY OF PRESENT ILLNESS:  Thank you for allowing me to participate in the care of your delightful patient.  As you know, Ed is a 52-year-old gentleman with a history of presumably acquired long QT syndrome whom I had the pleasure of meeting for the first time back in 2011.  As you may recall, at the time the patient was on methadone for his chronic pain syndrome and subsequently developed a very long QT interval measured at greater than 630 milliseconds associated with polymorphic VT.  His methadone was subsequently discontinued, resulting in shortening of his QT interval.  Unfortunately, a week later, the patient presented with palpitations and near-syncope and was evaluated by Dr. Pimentel, electrophysiologist at [facility] at that time.  He was recommended to have a dual-chamber ICD implantation given his high disposition for QT prolongation.  The patient subsequently transferred his care to us since then and has been doing quite well without any evidence of any recurrent polymorphic VT.  His QTc has been normalized.  He is on a beta blocker along with lisinopril for his hypertension.  I last saw him about 2 years ago.      His device was checked today and shows appropriate function.  There is no evidence of any ventricular arrhythmias.  His device is almost at PARESH.  When I saw him last, I encouraged the patient to go to [facility] to obtain genetic testing, as it was unclear whether the patient actually had acquired long QT or a congenital predisposition, and this clinic would be the best way to sort it out.  This does have implications for his adult children.  I reemphasized that point again, and Ed for some reason thought that his children need to be testing, but I told him that if he should be the one to be tested first and then we have an idea what to look for in his children.  His children, otherwise, he denies any near-syncope or syncope.      Ed otherwise is doing quite well.   We will remind him when his device needs to be replaced, which I would recommend for given that the uncertainty surrounding his polymorphic VT in the past and therefore still puts him at risk for having it again.      cc:   [place] [address]         MICKEY BAIRD MD             D: 2018 14:30   T: 2018 21:05   MT: SAULO      Name:     ELIAS CHAVIS   MRN:      [id number]        Account:      [id number]   :      1965           Service Date: 2018      Document: [id number]

## 2018-01-20 DIAGNOSIS — E29.1 TESTICULAR HYPOFUNCTION: ICD-10-CM

## 2018-01-20 NOTE — TELEPHONE ENCOUNTER
"Requested Prescriptions   Pending Prescriptions Disp Refills     Syringe/Needle, Disp, (SYRINGE LUER LOCK) 23G X 1\" 3 ML MISC  .Last Written Prescription Date:  2017  Last Fill Quantity: 12,  # refills: 6   Last Office Visit with G, [facility] or [facility] prescribing provider:  2017   Future Office Visit:      12 each 6     Si Syringe every 21 days    There is no refill protocol information for this order          "

## 2018-01-20 NOTE — TELEPHONE ENCOUNTER
"Syringe/Needle, Disp, (SYRINGE LUER LOCK) 23G X 1\" 3 ML MISC  Last Written Prescription Date:  03/06/2017  Last Fill Quantity: 12,   # refills: 6  Last Office Visit: 09/20/2017  Future Office visit:       Routing refill request to provider for review/approval because:  Drug not on the [facility], [facility] or [facility] refill protocol or controlled substance    "

## 2018-01-22 RX ORDER — SYRINGE W-NEEDLE,DISPOSAB,3 ML 23GX1"
1 SYRINGE, EMPTY DISPOSABLE MISCELLANEOUS
Qty: 12 EACH | Refills: 6 | Status: SHIPPED | OUTPATIENT
Start: 2018-01-22 | End: 2019-12-05

## 2018-01-23 NOTE — TELEPHONE ENCOUNTER
Patient is out of this medication- states he put in request over a week ago      Controlled Substance Refill Request for Depotestosterone     Last OV  9-20-17 with Dr. Rainer Castillo   Last refill  9-20-17  10ml      Problem List Complete:  No     PROVIDER TO CONSIDER COMPLETION OF PROBLEM LIST AND OVERVIEW/CONTROLLED SUBSTANCE AGREEMENT    Last Written Prescription Date:  9-20-17  Last Fill Quantity: 10ml,   # refills: 0    Last Office Visit with [facility] primary care provider: 9-20-17    Future Office visit:     Controlled substance agreement on file: No.     Processing:  Fax Rx to [facility] pharmacy     checked in past 6 months?  Yes 1-23-18 pt gets Testosterone from [facility].

## 2018-01-24 RX ORDER — TESTOSTERONE CYPIONATE 200 MG/ML
INJECTION, SOLUTION INTRAMUSCULAR
Qty: 10 ML | Refills: 0 | Status: SHIPPED | OUTPATIENT
Start: 2018-01-24 | End: 2018-04-23

## 2018-01-24 NOTE — TELEPHONE ENCOUNTER
Script printed and signed, in my Outbasket.  Remind pt we need 3 business days, he put the request in 2 business days ago.

## 2018-02-14 DIAGNOSIS — K29.50 CHRONIC GASTRITIS WITHOUT BLEEDING, UNSPECIFIED GASTRITIS TYPE: ICD-10-CM

## 2018-02-14 NOTE — TELEPHONE ENCOUNTER
"Requested Prescriptions   Pending Prescriptions Disp Refills     ranitidine (ZANTAC) 150 MG tablet  Last Written Prescription Date:  3/7/2017  Last Fill Quantity: 180 tablet,  # refills: 3   Last Office Visit  9/20/2017        with  G, P or [facility] prescribing provider:     Future Office Visit:    Next 5 appointments (look out 90 days)     Feb 16, 2018  4:00 PM CST   PHYSICAL with Rainer Castillo MD   [facility]    [address] [place] [id number] [phone]                180 tablet 3     Sig: Take 1 tablet (150 mg) by mouth 2 times daily    H2 Blockers Protocol Passed    2/14/2018  2:15 PM       Passed - Patient is age 12 or older       Passed - Recent or future visit with authorizing provider's specialty    Patient had office visit in the last year or has a visit in the next 30 days with authorizing provider.  See \"Patient Info\" tab in inbasket, or \"Choose Columns\" in Meds & Orders section of the refill encounter.               "

## 2018-02-16 PROBLEM — M54.9 BACKACHE: Status: ACTIVE | Noted: 2018-02-16

## 2018-04-11 ENCOUNTER — DOCUMENTATION ONLY (OUTPATIENT)
Dept: CARDIOLOGY | Facility: CLINIC | Age: 53
End: 2018-04-11

## 2018-04-23 DIAGNOSIS — E29.1 TESTICULAR HYPOFUNCTION: ICD-10-CM

## 2018-04-24 NOTE — TELEPHONE ENCOUNTER
Requested Prescriptions   Pending Prescriptions Disp Refills     testosterone cypionate (DEPOTESTOTERONE) 200 MG/ML injection [Pharmacy Med Name: Testosterone Cypionate Intramuscular Solution 200 MG/ML]      Last Written Prescription Date:  1/24/18  Last Fill Quantity: 10 mL,   # refills: 0  Last Office Visit: 9/20/17 [facility]  Future Office visit:       Routing refill request to provider for review/approval because:  Drug not on the G, P or [facility] refill protocol or controlled substance   10 mL 0     Sig: INJECT 1 ML IN THE MUSCLE EVERY 21 DAYS    There is no refill protocol information for this order

## 2018-04-25 RX ORDER — TESTOSTERONE CYPIONATE 200 MG/ML
INJECTION, SOLUTION INTRAMUSCULAR
Qty: 10 ML | Refills: 0 | Status: SHIPPED | OUTPATIENT
Start: 2018-04-25 | End: 2018-08-21

## 2018-04-25 NOTE — TELEPHONE ENCOUNTER
Controlled Substance Refill Request for Depotestosterone  Problem List Complete:  No     PROVIDER TO CONSIDER COMPLETION OF PROBLEM LIST AND OVERVIEW/CONTROLLED SUBSTANCE AGREEMENT      Controlled substance agreement on file: No.     Processing:  Fax Rx to [facility] pharmacy   checked in past 6 months?  Yes 1-23-18  mo concerns for the testosterone

## 2018-04-25 NOTE — TELEPHONE ENCOUNTER
Rx faxed to [facility]. Fax: [phone].    Princess FRANCISCO Caicedo, CMA

## 2018-06-07 ENCOUNTER — TELEPHONE (OUTPATIENT)
Dept: FAMILY MEDICINE | Facility: CLINIC | Age: 53
End: 2018-06-07

## 2018-06-07 NOTE — LETTER
June 7, 2018    Dion Yepez  [address]  [facility] [address]    Dear Erik Ashley cares about your health and your health plan.  I have reviewed your medical conditions, medication list and lab results, and am making recommendations based on this review to better manage your health.    You are in particular need of attention regarding:  -High Blood Pressure  -Colon Cancer Screening    I am recommending that you:     -schedule a FOLLOWUP OFFICE APPOINTMENT with me.       -schedule a COLONOSCOPY to look for colon cancer (due every 10 years or 5 years in higher risk situations.)        Colon cancer is now the second leading cause of cancer-related deaths in the [facility] for both men and women and there are over 130,000 new cases and 50,000 deaths per year from colon cancer.  Colonoscopies can prevent 90-95% of these deaths.  Problem lesions can be removed before they ever become cancer.  This test is not only looking for cancer, but also getting rid of precancerious lesions.    If you are under/uninsured, we recommend you contact the [employer] program. [employer] is a free colorectal cancer screening program that provides colonoscopies for eligible under/uninsured [place] men and women. If you are interested in receiving a free colonoscopy, please call [employer] at [phone] (mention code [employer]) to see if you re eligible.      If you do not wish to do a colonoscopy or cannot afford to do one, at this time, there is another option. It is called a FIT test or Fecal Immunochemical Occult Blood Test (take home stool sample kit).  It does not replace the colonoscopy for colorectal cancer screening, but it can detect hidden bleeding in the lower colon.  It does need to be repeated every year and if a positive result is obtained, you would be referred for a colonoscopy.          If you have completed either one of these tests at another facility, please call with the details of when  and where the tests were done and if they were normal or not. Or have the records sent to our clinic so that we can best coordinate your care.      Please call us at the [place] location:  [phone] or use [employer] to address the above recommendations.     Thank you for trusting [facility].  We appreciate the opportunity to serve you and look forward to supporting your healthcare in the future.    If you have (or plan to have) any of these tests done at a facility other than a [facility] or a [facility], please have the results sent to the [facility] location noted above.      Best Regards,    Rainer Castillo MD

## 2018-06-07 NOTE — TELEPHONE ENCOUNTER
Panel Management Review      Patient has the following on his problem list:     Hypertension   Last three blood pressure readings:  BP Readings from Last 3 Encounters:   01/17/18 136/90   09/20/17 (!) 140/92   05/22/17 136/84     Blood pressure: FAILED    HTN Guidelines:  Age 18-59 BP range:  Less than 140/90  Age 60-85 with Diabetes:  Less than 140/90  Age 60-85 without Diabetes:  less than 150/90      Composite cancer screening  Chart review shows that this patient is due/due soon for the following Colonoscopy  Summary:    Patient is due/failing the following:   BP CHECK and COLONOSCOPY    Action needed:   Patient needs office visit for Colonoscopy and Hypertension.    Type of outreach:    Sent letter.    Questions for provider review:    None                                                                                                                                    Coreen Blackwood CMA       Chart routed to NA .

## 2018-08-03 DIAGNOSIS — M54.50 CHRONIC MIDLINE LOW BACK PAIN WITHOUT SCIATICA: ICD-10-CM

## 2018-08-03 DIAGNOSIS — G89.29 CHRONIC MIDLINE LOW BACK PAIN WITHOUT SCIATICA: ICD-10-CM

## 2018-08-06 RX ORDER — CYCLOBENZAPRINE HCL 10 MG
TABLET ORAL
Qty: 30 TABLET | Refills: 1 | Status: SHIPPED | OUTPATIENT
Start: 2018-08-06 | End: 2018-09-27

## 2018-08-06 NOTE — TELEPHONE ENCOUNTER
Requested Prescriptions   Pending Prescriptions Disp Refills     cyclobenzaprine (FLEXERIL) 10 MG tablet [Pharmacy Med Name: CYCLOBENZAPRINE 10 MG TABLET]  Last Written Prescription Date:  6/1/18  Last Fill Quantity: 30,  # refills: 1   Last office visit: 9/20/2017 with prescribing provider:     Future Office Visit:     30 tablet 1     Sig: TAKE 1/2-1 TABLET BY MOUTH THREE TIMES A DAY AS NEEDED FOR MUSCLE SPASMS    There is no refill protocol information for this order        Controlled Substance Refill Request for cyclobenzaprine (FLEXERIL) 10 MG tablet  Problem List Complete:  No   checked in past 3 months?  Yes 8/6/18 no concerns

## 2018-08-21 ENCOUNTER — OFFICE VISIT (OUTPATIENT)
Dept: FAMILY MEDICINE | Facility: CLINIC | Age: 53
End: 2018-08-21
Payer: COMMERCIAL

## 2018-08-21 VITALS
RESPIRATION RATE: 18 BRPM | SYSTOLIC BLOOD PRESSURE: 138 MMHG | HEART RATE: 64 BPM | TEMPERATURE: 97.9 F | BODY MASS INDEX: 35.8 KG/M2 | WEIGHT: 264 LBS | OXYGEN SATURATION: 96 % | DIASTOLIC BLOOD PRESSURE: 76 MMHG

## 2018-08-21 DIAGNOSIS — F32.5 MAJOR DEPRESSIVE DISORDER WITH SINGLE EPISODE, IN FULL REMISSION (H): ICD-10-CM

## 2018-08-21 DIAGNOSIS — G89.29 CHRONIC MIDLINE LOW BACK PAIN WITHOUT SCIATICA: ICD-10-CM

## 2018-08-21 DIAGNOSIS — I10 BENIGN HYPERTENSION: ICD-10-CM

## 2018-08-21 DIAGNOSIS — H10.402 CHRONIC BACTERIAL CONJUNCTIVITIS OF LEFT EYE: ICD-10-CM

## 2018-08-21 DIAGNOSIS — N40.0 PROSTATIC HYPERTROPHY: ICD-10-CM

## 2018-08-21 DIAGNOSIS — K21.9 GASTROESOPHAGEAL REFLUX DISEASE WITHOUT ESOPHAGITIS: ICD-10-CM

## 2018-08-21 DIAGNOSIS — Z11.4 SCREENING FOR HIV (HUMAN IMMUNODEFICIENCY VIRUS): ICD-10-CM

## 2018-08-21 DIAGNOSIS — M75.42 IMPINGEMENT SYNDROME, SHOULDER, LEFT: Primary | ICD-10-CM

## 2018-08-21 DIAGNOSIS — Z87.898 H/O SYNCOPE: ICD-10-CM

## 2018-08-21 DIAGNOSIS — E29.1 TESTICULAR HYPOFUNCTION: ICD-10-CM

## 2018-08-21 DIAGNOSIS — E29.1 HYPOGONADISM MALE: ICD-10-CM

## 2018-08-21 DIAGNOSIS — I10 ESSENTIAL HYPERTENSION: ICD-10-CM

## 2018-08-21 DIAGNOSIS — E66.01 MORBID OBESITY (H): ICD-10-CM

## 2018-08-21 DIAGNOSIS — M54.50 CHRONIC MIDLINE LOW BACK PAIN WITHOUT SCIATICA: ICD-10-CM

## 2018-08-21 DIAGNOSIS — Z12.11 SCREEN FOR COLON CANCER: ICD-10-CM

## 2018-08-21 DIAGNOSIS — E78.5 HYPERLIPIDEMIA LDL GOAL <100: ICD-10-CM

## 2018-08-21 PROCEDURE — 20610 DRAIN/INJ JOINT/BURSA W/O US: CPT | Performed by: FAMILY MEDICINE

## 2018-08-21 PROCEDURE — 99214 OFFICE O/P EST MOD 30 MIN: CPT | Mod: 25 | Performed by: FAMILY MEDICINE

## 2018-08-21 RX ORDER — AMLODIPINE BESYLATE 10 MG/1
10 TABLET ORAL DAILY
Qty: 90 TABLET | Refills: 3 | Status: SHIPPED | OUTPATIENT
Start: 2018-08-21 | End: 2019-06-13

## 2018-08-21 RX ORDER — METOPROLOL SUCCINATE 50 MG/1
50 TABLET, EXTENDED RELEASE ORAL DAILY
Qty: 90 TABLET | Refills: 1 | Status: SHIPPED | OUTPATIENT
Start: 2018-08-21 | End: 2019-06-13 | Stop reason: DRUGHIGH

## 2018-08-21 RX ORDER — TESTOSTERONE CYPIONATE 200 MG/ML
INJECTION, SOLUTION INTRAMUSCULAR
Qty: 10 ML | Refills: 5 | Status: SHIPPED | OUTPATIENT
Start: 2018-08-21 | End: 2019-02-20

## 2018-08-21 RX ORDER — SULFACETAMIDE SODIUM 100 MG/ML
1 SOLUTION/ DROPS OPHTHALMIC
Qty: 1 BOTTLE | Refills: 0 | Status: SHIPPED | OUTPATIENT
Start: 2018-08-21 | End: 2018-08-28

## 2018-08-21 ASSESSMENT — ANXIETY QUESTIONNAIRES
7. FEELING AFRAID AS IF SOMETHING AWFUL MIGHT HAPPEN: NOT AT ALL
5. BEING SO RESTLESS THAT IT IS HARD TO SIT STILL: NOT AT ALL
4. TROUBLE RELAXING: NOT AT ALL
GAD7 TOTAL SCORE: 0
2. NOT BEING ABLE TO STOP OR CONTROL WORRYING: NOT AT ALL
GAD7 TOTAL SCORE: 0
1. FEELING NERVOUS, ANXIOUS, OR ON EDGE: NOT AT ALL
3. WORRYING TOO MUCH ABOUT DIFFERENT THINGS: NOT AT ALL
6. BECOMING EASILY ANNOYED OR IRRITABLE: NOT AT ALL
GAD7 TOTAL SCORE: 0
7. FEELING AFRAID AS IF SOMETHING AWFUL MIGHT HAPPEN: NOT AT ALL

## 2018-08-21 ASSESSMENT — PATIENT HEALTH QUESTIONNAIRE - PHQ9
10. IF YOU CHECKED OFF ANY PROBLEMS, HOW DIFFICULT HAVE THESE PROBLEMS MADE IT FOR YOU TO DO YOUR WORK, TAKE CARE OF THINGS AT HOME, OR GET ALONG WITH OTHER PEOPLE: NOT DIFFICULT AT ALL
SUM OF ALL RESPONSES TO PHQ QUESTIONS 1-9: 0
SUM OF ALL RESPONSES TO PHQ QUESTIONS 1-9: 0

## 2018-08-21 NOTE — MR AVS SNAPSHOT
After Visit Summary   2018    Dion Yepez    MRN: [id number]           Patient Information     Date Of Birth          1965        Visit Information        Provider Department      2018 11:00 AM Garrett Cortes MD [facility]        Today's Diagnoses     Impingement syndrome, shoulder, left    -  1    Screen for colon cancer        Screening for HIV (human immunodeficiency virus)        Testicular hypofunction        Morbid obesity (H)        Gastroesophageal reflux disease without esophagitis        Prostatic hypertrophy        Chronic bacterial conjunctivitis of left eye        Hyperlipidemia LDL goal <100        H/O syncope        Hypogonadism male        Essential hypertension        Chronic midline low back pain without sciatica        Benign hypertension          Care Instructions    (M75.42) Impingement syndrome, shoulder, left  (primary encounter diagnosis)  Comment:      Plan:  CODMAN'S EXERCISES  ,THAT IS PENDULUM RANGE OF MOTION 30 EACH TWICE DAILY    THUMB UP EXERCISES INTO PAIN 30 EACH TWICE DAILY    INTERNAL  AND EXTERNAL ROTATION  with AND WITHOUT RESISTANCE OR WEIGHT 30 EACH TWICE DAILY    SWORD SHEATH EXERCISE 30 EACH TWICE DAILY    WALL STRETCH EXERCISE 30 SECONDS EACH TWICE DAILY    POSTERIOR SHOULDER STRETCH AND HOLD 3 10 SECOND STRETCHES TWICE DAILY    ISOMETRIC EXERCISE 60 DEGREES ABDUCTION, ADDUCTION, 90 DEGREE THUMB UP POSITION    AVOID PAINFUL ARC    ICE TWICE DAILY X 5 MINUTES PRIOR TO EXERCISE OR AS NEEDED FOR PAIN CONTROL      TREATMENT PROGNOSIS BENEFITS AND RISKS DISCUSSED     ANATOMIC SITE:  Left posterior shoulder     PROCEDURE:  Injection     BETADYNE CLEANSING WITHOUT COMPLICATION     1:1 MIXTURE KENALOG 1% LIDOCAINE WITHOUT COMPLICATION     AMOUNT OF KENALO mg thee    NDC NUMBER [id number]    INJECTION WITH NO TOUCH TECHNIQUE    SIDE EFFECTS BENEFITS AND RISKS DISCUSSED       TREATMENT PROGNOSIS BENEFITS AND RISKS DISCUSSED     MONITOR FOR ALLERGIC AND VASCULAR SIDE EFFECTS    MEDICATION RISKS SIDE EFFECTS BENEFITS AND RISKS DISCUSSED     ELLIOT MYERS JR., MD     08/21/18            (Z12.11) Screen for colon cancer  Comment:    Plan:      (Z11.4) Screening for HIV (human immunodeficiency virus)  Comment:    Plan: HIV Screening             (E29.1) Testicular hypofunction  Comment:    Plan:      (E66.01) Morbid obesity (H)  Comment:         Plan: Hemoglobin A1c             (K21.9) Gastroesophageal reflux disease without esophagitis  Comment:    Plan:      (N40.0) Prostatic hypertrophy  Comment:    Plan:      (H10.402) Chronic bacterial conjunctivitis of left eye  Comment:    Plan: sulfacetamide (BLEPH-10) 10 % ophthalmic         solution             (E78.5) Hyperlipidemia LDL goal <100  Comment:    Plan: Lipid panel reflex to direct LDL Fasting             (Z87.898) H/O syncope  Comment: heart related has has defibrillator  x 8 years 2010 needing new battery pack in near futrue   Plan:                 Follow-ups after your visit        Follow-up notes from your care team     Return in about 4 years (around 8/21/2022).      Who to contact     If you have questions or need follow up information about today's clinic visit or your schedule please contact [facility] directly at [phone].  Normal or non-critical lab and imaging results will be communicated to you by MyChart, letter or phone within 4 business days after the clinic has received the results. If you do not hear from us within 7 days, please contact the clinic through MyChart or phone. If you have a critical or abnormal lab result, we will notify you by phone as soon as possible.  Submit refill requests through [employer] or call your pharmacy and they will forward the refill request to us. Please allow 3 business days for your refill to be completed.          Additional Information About  "Your Visit        [employer] Information     [employer] lets you send messages to your doctor, view your test results, renew your prescriptions, schedule appointments and more. To sign up, go to [email or web address] . Click on \"Log in\" on the left side of the screen, which will take you to the Welcome page. Then click on \"Sign up Now\" on the right side of the page.     You will be asked to enter the access code listed below, as well as some personal information. Please follow the directions to create your username and password.     Your access code is: [email or web address]  Expires: 2018 11:11 AM     Your access code will  in 90 days. If you need help or a new code, please call your [place] clinic or [phone].        Care EveryWhere ID     This is your Care EveryWhere ID. This could be used by other organizations to access your [place] medical records  [id number]        Your Vitals Were     Pulse Temperature Respirations Pulse Oximetry BMI (Body Mass Index)       64 97.9  F (36.6  C) (Tympanic) 18 96% 35.8 kg/m2        Blood Pressure from Last 3 Encounters:   18 138/76   18 136/90   17 (!) 140/92    Weight from Last 3 Encounters:   18 264 lb (119.7 kg)   18 262 lb (118.8 kg)   17 268 lb (121.6 kg)              We Performed the Following     Hemoglobin A1c     HIV Screening     Lipid panel reflex to direct LDL Fasting          Today's Medication Changes          These changes are accurate as of 18 12:06 PM.  If you have any questions, ask your nurse or doctor.               Start taking these medicines.        Dose/Directions    sulfacetamide 10 % ophthalmic solution   Commonly known as:  BLEPH-10   Used for:  Chronic bacterial conjunctivitis of left eye   Started by:  Garrett Cortes MD        Dose:  1 drop   Apply 1 drop to eye every 4 hours (while awake) for 7 days   Quantity:  1 Bottle   Refills:  0         These medicines have changed or have updated " "prescriptions.        Dose/Directions    amLODIPine 10 MG tablet   Commonly known as:  NORVASC   This may have changed:    - medication strength  - See the new instructions.   Used for:  Essential hypertension   Changed by:  Garrett Cortes MD        Dose:  10 mg   Take 1 tablet (10 mg) by mouth daily   Quantity:  90 tablet   Refills:  3       metoprolol succinate 50 MG 24 hr tablet   Commonly known as:  TOPROL-XL   This may have changed:  See the new instructions.   Used for:  Benign hypertension   Changed by:  Garrett Cortes MD        Dose:  50 mg   Take 1 tablet (50 mg) by mouth daily   Quantity:  90 tablet   Refills:  1            Where to get your medicines      These medications were sent to [facility] [address] [place] [address] [address] [address]     Phone:  [phone]     amLODIPine 10 MG tablet    metoprolol succinate 50 MG 24 hr tablet    Syringe/Needle (Disp) 20G X 1\" 1 ML Misc         These medications were sent to [facility] [place], [place] - [address] [address]     Phone:  [phone]     sulfacetamide 10 % ophthalmic solution         Some of these will need a paper prescription and others can be bought over the counter.  Ask your nurse if you have questions.     Bring a paper prescription for each of these medications     testosterone cypionate 200 MG/ML injection                Primary Care Provider Office Phone # Fax #    Rainer Castillo [phone] [address]  [facility] [address]        Equal Access to Services     [facility] CenterFRANCISCO AH: Hadii lawanda ku hadasho Soomaali, waaxda luqadaha, qaybta kaalmada adeegyada, kate perez. So [facility] [phone].    ATENCIÓN: Si habla español, tiene a alexander disposición servicios gratuitos de asistencia lingüística. Llame al [phone].    We comply with applicable federal civil rights laws and " "[place] laws. We do not discriminate on the basis of race, color, national origin, age, disability, sex, sexual orientation, or gender identity.            Thank you!     Thank you for choosing [facility]  for your care. Our goal is always to provide you with excellent care. Hearing back from our patients is one way we can continue to improve our services. Please take a few minutes to complete the written survey that you may receive in the mail after your visit with us. Thank you!             Your Updated Medication List - Protect others around you: Learn how to safely use, store and throw away your medicines at www.disposemymeds.org.          This list is accurate as of 8/21/18 12:06 PM.  Always use your most recent med list.                   Brand Name Dispense Instructions for use Diagnosis    Adult Blood Pressure Cuff Lg Kit     1 kit    One unit    Essential hypertension       amLODIPine 10 MG tablet    NORVASC    90 tablet    Take 1 tablet (10 mg) by mouth daily    Essential hypertension       * B-D LUER-TONI SYRINGE 20G X 1\" 3 ML Misc   Generic drug:  Syringe/Needle (Disp)           * Syringe Luer Lock 23G X 1\" 3 ML Misc     12 each    1 Syringe every 21 days    Testicular hypofunction       cimetidine 300 MG tablet    TAGAMET    90 tablet    Take 1 tablet (300 mg) by mouth At Bedtime    Chronic gastritis without bleeding, unspecified gastritis type       cyclobenzaprine 10 MG tablet    FLEXERIL    30 tablet    TAKE 1/2-1 TABLET BY MOUTH THREE TIMES A DAY AS NEEDED FOR MUSCLE SPASMS    Chronic midline low back pain without sciatica       gabapentin 300 MG capsule    NEURONTIN    120 capsule    TAKE 1 CAPSULE BY MOUTH twice daily and take 2 at bedtime    Chronic midline low back pain without sciatica       ibuprofen 800 MG tablet    ADVIL/MOTRIN    90 tablet    TAKE 1 TABLET BY MOUTH EVERY 8 HOURS AS NEEDED FOR PAIN    Chronic midline low back pain without sciatica       " "lisinopril 20 MG tablet    PRINIVIL/ZESTRIL    30 tablet    TAKE 1 TABLET BY MOUTH DAILY    Benign hypertension       metoprolol succinate 50 MG 24 hr tablet    TOPROL-XL    90 tablet    Take 1 tablet (50 mg) by mouth daily    Benign hypertension       omeprazole 40 MG capsule    priLOSEC    30 capsule    TAKE 1 CAPSULE BY MOUTH EVERY DAY    Gastroesophageal reflux disease without esophagitis       order for DME      1 proten shake a day        ranitidine 150 MG tablet    ZANTAC    180 tablet    Take 1 tablet (150 mg) by mouth 2 times daily    Chronic gastritis without bleeding, unspecified gastritis type       SUBOXONE 8-2 MG per film   Generic drug:  buprenorphine HCl-naloxone HCl      Place 1 Film under the tongue 3 times daily        sulfacetamide 10 % ophthalmic solution    BLEPH-10    1 Bottle    Apply 1 drop to eye every 4 hours (while awake) for 7 days    Chronic bacterial conjunctivitis of left eye       Syringe/Needle (Disp) 20G X 1\" 1 ML Misc     12 each    Inject 1 mL into the muscle every 21 days    Hypogonadism male       testosterone cypionate 200 MG/ML injection    DEPOTESTOTERONE    10 mL    INJECT 1 ML IN THE MUSCLE EVERY 21 DAYS    Testicular hypofunction       * Notice:  This list has 2 medication(s) that are the same as other medications prescribed for you. Read the directions carefully, and ask your doctor or other care provider to review them with you.      "

## 2018-08-21 NOTE — PATIENT INSTRUCTIONS
(M75.42) Impingement syndrome, shoulder, left  (primary encounter diagnosis)  Comment:      Plan:  CODMAN'S EXERCISES  ,THAT IS PENDULUM RANGE OF MOTION 30 EACH TWICE DAILY    THUMB UP EXERCISES INTO PAIN 30 EACH TWICE DAILY    INTERNAL  AND EXTERNAL ROTATION  with AND WITHOUT RESISTANCE OR WEIGHT 30 EACH TWICE DAILY    SWORD SHEATH EXERCISE 30 EACH TWICE DAILY    WALL STRETCH EXERCISE 30 SECONDS EACH TWICE DAILY    POSTERIOR SHOULDER STRETCH AND HOLD 3 10 SECOND STRETCHES TWICE DAILY    ISOMETRIC EXERCISE 60 DEGREES ABDUCTION, ADDUCTION, 90 DEGREE THUMB UP POSITION    AVOID PAINFUL ARC    ICE TWICE DAILY X 5 MINUTES PRIOR TO EXERCISE OR AS NEEDED FOR PAIN CONTROL      TREATMENT PROGNOSIS BENEFITS AND RISKS DISCUSSED     ANATOMIC SITE:  Left posterior shoulder     PROCEDURE:  Injection     BETADYNE CLEANSING WITHOUT COMPLICATION     1:1 MIXTURE KENALOG 1% LIDOCAINE WITHOUT COMPLICATION     AMOUNT OF KENALO mg kenalok    NDC NUMBER [id number]    INJECTION WITH NO TOUCH TECHNIQUE    SIDE EFFECTS BENEFITS AND RISKS DISCUSSED      TREATMENT PROGNOSIS BENEFITS AND RISKS DISCUSSED     MONITOR FOR ALLERGIC AND VASCULAR SIDE EFFECTS    MEDICATION RISKS SIDE EFFECTS BENEFITS AND RISKS DISCUSSED     ELLIOT MYERS JR., MD     18            (Z12.11) Screen for colon cancer  Comment:    Plan:      (Z11.4) Screening for HIV (human immunodeficiency virus)  Comment:    Plan: HIV Screening             (E29.1) Testicular hypofunction  Comment:    Plan:      (E66.01) Morbid obesity (H)  Comment:         Plan: Hemoglobin A1c             (K21.9) Gastroesophageal reflux disease without esophagitis  Comment:    Plan:      (N40.0) Prostatic hypertrophy  Comment:    Plan:      (H10.402) Chronic bacterial conjunctivitis of left eye  Comment:    Plan: sulfacetamide (BLEPH-10) 10 % ophthalmic         solution             (E78.5) Hyperlipidemia LDL goal <100  Comment:    Plan: Lipid panel reflex to direct LDL  Fasting             (Z87.898) H/O syncope  Comment: heart related has has defibrillator  x 8 years 2010 needing new battery pack in near futrue   Plan:

## 2018-08-21 NOTE — PROGRESS NOTES
SUBJECTIVE:   Dion Yepez is a 52 year old male who presents to clinic today for the following health issues:      Joint Pain LEFT SHOULDER     Onset: originally 10 years ago    Description:   Location: left shoulder  Character: Dull ache and stiffness    Intensity: 4/10    Progression of Symptoms: worse recently    Accompanying Signs & Symptoms:  Other symptoms: none    History:   Previous similar pain: YES- frozen shoulder      Precipitating factors:   Trauma or overuse: no     Alleviating factors:  Improved by: costisone many years ago    Therapies Tried and outcome: inj helped    Depression and Anxiety Follow-Up    Status since last visit:  COMPLETER REMISSION    Other associated symptoms:None    Complicating factors:     Significant life event: No     Current substance abuse: None    PHQ-9 8/21/2018   Total Score 0   Q9: Suicide Ideation Not at all     HALINA-7 SCORE 8/21/2018   Total Score 0 (minimal anxiety)   Total Score 0     In the past two weeks have you had thoughts of suicide or self-harm?  No.    Do you have concerns about your personal safety or the safety of others?   No  PHQ-9  English  PHQ-9   Any Language  HALINA-7  Suicide Assessment Five-step Evaluation and Treatment (SAFE-T)    Amount of exercise or physical activity: 6-7 days/week for an average of 30-45 minutes    Problems taking medications regularly: No    Medication side effects: none    Diet: low salt and low fat/cholesterol         Hypertension Follow-up      Outpatient blood pressures are not being checked.    Low Salt Diet: no added salt      .(M75.42) Impingement syndrome, shoulder, left  (primary encounter diagnosis)    Comment:  N     Plan:              (E29.1) Testicular hypofunction    Comment:      Plan: testosterone cypionate (DEPOTESTOTERONE) 200           MG/ML injection           MONTHLY SELF INJECTION         (E66.01) Morbid obesity (H)    Comment:      Plan: Hemoglobin A1c           WANTS DIABETES SCREEN AT RISK   "        (K21.9) Gastroesophageal reflux disease without esophagitis    Comment:      Plan:  WELL CONTROLLED          (N40.0) Prostatic hypertrophy    Comment:      Plan:  MILD SYMPTOMS          (H10.402) Chronic bacterial conjunctivitis of left eye    Comment:  RECURRENT EPISODES      Plan: sulfacetamide (BLEPH-10) 10 % ophthalmic           solution           PRN SULFA TREATMENT     SIDE EFFECTS BENEFITS AND RISKS DISCUSSED      TREATMENT PROGNOSIS BENEFITS AND RISKS DISCUSSED     MEDICATION RISKS SIDE EFFECTS BENEFITS AND RISKS DISCUSSED PRESCRIPTION GIVEN         (E78.5) Hyperlipidemia LDL goal <100    Comment:      Plan: Lipid panel reflex to direct LDL Fasting           PER REQUEST          (Z87.898) H/O syncope    Comment: heart related has has defibrillator  x 8 years 2010 needing new battery pack in [place]     Plan:  MONITORING FOR REPLACMENT OF BATTERY         (E29.1) Hypogonadism male    Comment:      Plan: Syringe/Needle, Disp, 20G X 1\" 1 ML MISC                   (I10) Essential hypertension    Comment:      Plan: amLODIPine (NORVASC) 10 MG tablet                   (M54.5,  G89.29) Chronic midline low back pain without sciatica    Comment:      Plan:             (I10) Benign hypertension    Comment:      Plan: metoprolol succinate (TOPROL-XL) 50 MG 24 hr           tablet           AMLODOPINE 10MG     SIDE EFFECTS BENEFITS AND RISKS DISCUSSED      TREATMENT PROGNOSIS BENEFITS AND RISKS DISCUSSED     MEDICATION RISKS SIDE EFFECTS BENEFITS AND RISKS DISCUSSED                       Topic Date Due     DEPRESSION ACTION PLAN Q1 YR  09/02/1983     PHQ-9 Q6 MONTHS  01/10/2013     COLON CANCER SCREEN (SYSTEM ASSIGNED)  09/02/2015     LIPID MONITORING Q1 YEAR  01/23/2018               .  Current Outpatient Prescriptions   Medication Sig Dispense Refill     amLODIPine (NORVASC) 10 MG tablet Take 1 tablet (10 mg) by mouth daily 90 tablet 3     B-D LUER-TONI SYRINGE 20G X 1\" 3 ML MISC        Blood Pressure " "Monitoring (ADULT BLOOD PRESSURE CUFF LG) KIT One unit 1 kit 0     cyclobenzaprine (FLEXERIL) 10 MG tablet TAKE 1/2-1 TABLET BY MOUTH THREE TIMES A DAY AS NEEDED FOR MUSCLE SPASMS 30 tablet 1     gabapentin (NEURONTIN) 300 MG capsule TAKE 1 CAPSULE BY MOUTH twice daily and take 2 at bedtime 120 capsule 3     ibuprofen (ADVIL/MOTRIN) 800 MG tablet TAKE 1 TABLET BY MOUTH EVERY 8 HOURS AS NEEDED FOR PAIN 90 tablet 9     metoprolol succinate (TOPROL-XL) 50 MG 24 hr tablet Take 1 tablet (50 mg) by mouth daily 90 tablet 1     order for DME 1 proten shake a day       ranitidine (ZANTAC) 150 MG tablet Take 1 tablet (150 mg) by mouth 2 times daily 180 tablet 1     sulfacetamide (BLEPH-10) 10 % ophthalmic solution Apply 1 drop to eye every 4 hours (while awake) for 7 days 1 Bottle 0     Syringe/Needle, Disp, (SYRINGE LUER LOCK) 23G X 1\" 3 ML MISC 1 Syringe every 21 days 12 each 6     Syringe/Needle, Disp, 20G X 1\" 1 ML MISC Inject 1 mL into the muscle every 21 days 12 each 11     testosterone cypionate (DEPOTESTOTERONE) 200 MG/ML injection INJECT 1 ML IN THE MUSCLE EVERY 21 DAYS 10 mL 5     cimetidine (TAGAMET) 300 MG tablet Take 1 tablet (300 mg) by mouth At Bedtime (Patient not taking: Reported on 8/21/2018) 90 tablet 1     lisinopril (PRINIVIL/ZESTRIL) 20 MG tablet TAKE 1 TABLET BY MOUTH DAILY (Patient not taking: Reported on 8/21/2018) 30 tablet 3     omeprazole (PRILOSEC) 40 MG capsule TAKE 1 CAPSULE BY MOUTH EVERY DAY (Patient not taking: Reported on 8/21/2018) 30 capsule 5     SUBOXONE 8-2 MG film Place 1 Film under the tongue 3 times daily   0     [DISCONTINUED] amLODIPine (NORVASC) 5 MG tablet TAKE 1 TABLET BY MOUTH EVERY DAY 90 tablet 0     [DISCONTINUED] gabapentin (NEURONTIN) 300 MG capsule TAKE 1 CAPSULE BY MOUTH TWO TIMES A DAY AND TAKE 2 CAPSULES AT BEDTIME (Patient not taking: Reported on 8/21/2018) 120 capsule 3     [DISCONTINUED] metoprolol succinate (TOPROL-XL) 50 MG 24 hr tablet TAKE 1 TABLET BY MOUTH " EVERY DAY 90 tablet 1     [DISCONTINUED] testosterone cypionate (DEPOTESTOTERONE) 200 MG/ML injection INJECT 1 ML IN THE MUSCLE EVERY 21 DAYS 10 mL 0            Allergies   Allergen Reactions     Methadone      Prolonged qt interval, temporary pacemaker         Immunization History   Administered Date(s) Administered     Influenza (IIV3) PF 09/22/2009, 11/22/2011, 11/21/2012, 10/10/2013     Influenza Vaccine IM 3yrs+ 4 Valent IIV4 11/19/2014, 10/12/2016     TDAP Vaccine (Adacel) 09/22/2009               reports that he does not drink alcohol.        reports that he does not use illicit drugs.      family history includes Cancer - colorectal in his father, paternal aunt, paternal grandfather, paternal grandmother, and paternal uncle; Thyroid Disease in his mother. There is no history of Diabetes, Coronary Artery Disease, Hypertension, Hyperlipidemia, Cerebrovascular Disease, Breast Cancer, Colon Cancer, Prostate Cancer, Other Cancer, Depression, Anxiety Disorder, Mental Illness, Substance Abuse, Anesthesia Reaction, Asthma, Osteoperosis, Genetic Disorder, Obesity, or Unknown/Adopted.      indicated that the status of his no family hx of is unknown. He indicated that the status of his mother is unknown. He indicated that the status of his father is unknown. He indicated that the status of his paternal grandmother is unknown. He indicated that the status of his paternal grandfather is unknown. He indicated that the status of his paternal aunt is unknown. He indicated that the status of his paternal uncle is unknown.        has a past surgical history that includes REDUCE TESTIS TORSION (1978); Implant implantable cardioverter defibrillator (12/2010); and Heart Cath Left heart cath (12/2010).       reports that he does not engage in sexual activity.    .  Pediatric History   Patient Guardian Status     Not on file.     Other Topics Concern     Caffeine Concern No     coffee: 1 cup a day     Sleep Concern No     Stress  Concern No     Weight Concern No     Special Diet No     Exercise No     not due to back pain     Seat Belt Yes     Social History Narrative             reports that he has never smoked. He has never used smokeless tobacco.        Medical, social, surgical, and family histories reviewed.        Labs reviewed in EPIC  Patient Active Problem List   Diagnosis     Chronic Back Pain     Testicular hypofunction     GERD (gastroesophageal reflux disease)     HYPERLIPIDEMIA LDL GOAL <130     Anxiety     Benign hypertension     Backache     Major depressive disorder with single episode, in full remission (H)     Impotence of organic origin     Myopia     Panic attacks     Prostatic hypertrophy     Regular astigmatism     Testosterone deficiency     Essential hypertension     Morbid obesity (H)     H/O syncope       Past Surgical History:   Procedure Laterality Date     C REDUCE TESTIS TORSION  1978     HEART CATH LEFT HEART CATH  12/2010    [facility] - normal coronary arteries      IMPLANT IMPLANTABLE CARDIOVERTER DEFIBRILLATOR  12/2010         Social History   Substance Use Topics     Smoking status: Never Smoker     Smokeless tobacco: Never Used     Alcohol use No       Family History   Problem Relation Age of Onset     Thyroid Disease Mother      Cancer - colorectal Father      Cancer - colorectal Paternal Grandmother      Cancer - colorectal Paternal Grandfather      Cancer - colorectal Paternal Aunt      Cancer - colorectal Paternal Uncle      Diabetes No family hx of      Coronary Artery Disease No family hx of      Hypertension No family hx of      Hyperlipidemia No family hx of      Cerebrovascular Disease No family hx of      Breast Cancer No family hx of      Colon Cancer No family hx of      Prostate Cancer No family hx of      Other Cancer No family hx of      Depression No family hx of      Anxiety Disorder No family hx of      Mental Illness No family hx of      Substance Abuse No family hx of       "Anesthesia Reaction No family hx of      Asthma No family hx of      Osteoperosis No family hx of      Genetic Disorder No family hx of      Obesity No family hx of      Unknown/Adopted No family hx of              Current Outpatient Prescriptions   Medication Sig Dispense Refill     amLODIPine (NORVASC) 10 MG tablet Take 1 tablet (10 mg) by mouth daily 90 tablet 3     B-D LUER-TONI SYRINGE 20G X 1\" 3 ML MISC        Blood Pressure Monitoring (ADULT BLOOD PRESSURE CUFF LG) KIT One unit 1 kit 0     cyclobenzaprine (FLEXERIL) 10 MG tablet TAKE 1/2-1 TABLET BY MOUTH THREE TIMES A DAY AS NEEDED FOR MUSCLE SPASMS 30 tablet 1     gabapentin (NEURONTIN) 300 MG capsule TAKE 1 CAPSULE BY MOUTH twice daily and take 2 at bedtime 120 capsule 3     ibuprofen (ADVIL/MOTRIN) 800 MG tablet TAKE 1 TABLET BY MOUTH EVERY 8 HOURS AS NEEDED FOR PAIN 90 tablet 9     metoprolol succinate (TOPROL-XL) 50 MG 24 hr tablet Take 1 tablet (50 mg) by mouth daily 90 tablet 1     order for DME 1 proten shake a day       ranitidine (ZANTAC) 150 MG tablet Take 1 tablet (150 mg) by mouth 2 times daily 180 tablet 1     sulfacetamide (BLEPH-10) 10 % ophthalmic solution Apply 1 drop to eye every 4 hours (while awake) for 7 days 1 Bottle 0     Syringe/Needle, Disp, (SYRINGE LUER LOCK) 23G X 1\" 3 ML MISC 1 Syringe every 21 days 12 each 6     Syringe/Needle, Disp, 20G X 1\" 1 ML MISC Inject 1 mL into the muscle every 21 days 12 each 11     testosterone cypionate (DEPOTESTOTERONE) 200 MG/ML injection INJECT 1 ML IN THE MUSCLE EVERY 21 DAYS 10 mL 5     cimetidine (TAGAMET) 300 MG tablet Take 1 tablet (300 mg) by mouth At Bedtime (Patient not taking: Reported on 8/21/2018) 90 tablet 1     lisinopril (PRINIVIL/ZESTRIL) 20 MG tablet TAKE 1 TABLET BY MOUTH DAILY (Patient not taking: Reported on 8/21/2018) 30 tablet 3     omeprazole (PRILOSEC) 40 MG capsule TAKE 1 CAPSULE BY MOUTH EVERY DAY (Patient not taking: Reported on 8/21/2018) 30 capsule 5     SUBOXONE 8-2 MG " film Place 1 Film under the tongue 3 times daily   0     [DISCONTINUED] amLODIPine (NORVASC) 5 MG tablet TAKE 1 TABLET BY MOUTH EVERY DAY 90 tablet 0     [DISCONTINUED] gabapentin (NEURONTIN) 300 MG capsule TAKE 1 CAPSULE BY MOUTH TWO TIMES A DAY AND TAKE 2 CAPSULES AT BEDTIME (Patient not taking: Reported on 8/21/2018) 120 capsule 3     [DISCONTINUED] metoprolol succinate (TOPROL-XL) 50 MG 24 hr tablet TAKE 1 TABLET BY MOUTH EVERY DAY 90 tablet 1     [DISCONTINUED] testosterone cypionate (DEPOTESTOTERONE) 200 MG/ML injection INJECT 1 ML IN THE MUSCLE EVERY 21 DAYS 10 mL 0         Recent Labs   Lab Test  09/20/17   1401  01/23/17   1126  02/12/16   1304  02/25/15   1410  02/24/15   1429  12/16/13   1126   LDL   --   108*   --    --   95  106   HDL   --   49   --    --   48  64   TRIG   --   82   --    --   100  73   ALT   --    --   58  29   --    --    CR  1.02  0.85  0.80  1.20   --    --    GFRESTIMATED  77  >90  Non  GFR Calc    >90  64   --    --    GFRESTBLACK  >90  >90  African American GFR Calc    >90  78   --    --    POTASSIUM  4.7  4.7  4.8  4.4   --    --    TSH   --   3.83   --    --    --    --             BP Readings from Last 6 Encounters:   08/21/18 138/76   01/17/18 136/90   09/20/17 (!) 140/92   05/22/17 136/84   03/06/17 (!) 142/94   01/23/17 (!) 150/100         Wt Readings from Last 3 Encounters:   08/21/18 264 lb (119.7 kg)   01/17/18 262 lb (118.8 kg)   09/20/17 268 lb (121.6 kg)               Positive symptoms or findings indicated by bold designation:         ROS: 10 point ROS neg other than the symptoms noted above in the HPI.except  has Chronic Back Pain; Testicular hypofunction; GERD (gastroesophageal reflux disease); HYPERLIPIDEMIA LDL GOAL <130; Anxiety; Benign hypertension; Backache; Depression; Impotence of organic origin; Myopia; Panic attacks; Prostatic hypertrophy; Regular astigmatism; Testosterone deficiency; and Essential hypertension on his problem list.  Review  Of Systems    Skin: negative    Eyes: negative RECURRENT BLEPHARITIS     Ears/Nose/Throat: negative    Respiratory: No shortness of breath, dyspnea on exertion, cough, or hemoptysis    Cardiovascular: negative    Gastrointestinal: negative OCCASIONAL GERD    Genitourinary: negative    Musculoskeletal: ;LEFT SHOULDER PAIN     Neurologic: negative    Psychiatric: negative    Hematologic/Lymphatic/Immunologic: negative    Endocrine: MODERATE OBESITY                  PE:  /76 (Cuff Size: Adult Large)  Pulse 64  Temp 97.9  F (36.6  C) (Tympanic)  Resp 18  Wt 264 lb (119.7 kg)  SpO2 96%  BMI 35.8 kg/m2 Body mass index is 35.8 kg/(m^2).        Constitutional: general appearance, well nourished, well developed, in no acute distress, well developed, appears stated age, normal body habitus,  MODERATE OBESITY          Eyes:; The patient has normal eyelids sclerae and conjunctivae :           Ears/Nose/Throat: external ear, overall: normal appearance; external nose, overall: benign appearance, normal moujth gums and lips           Neck: thyroid, overall: normal size, normal consistency, nontender,          Respiratory:  palpation of chest, overall: normal excursion,    Clear to percussion and auscultation     NO Tachypnea    NORMAL  Color          Cardiovascular:  Good color with no peripheral edema    Regular sinus rhythm without murmur.  Physiologic heart sounds    Heart is unelarged    .     Chest/Breast: normal shape           Abdominal exam,  Liver and spleen are  unenlarged        Tenderness    Scars              Urogenital; no renal, flank or bladder  tenderness;           Lymphatic: neck nodes,     Other nodes          Musculoskeletal:  Brief ortho exam normal except:  NORMAL RANGE OF MOTION OF BACKJ  PAIN INTERNAL  AND EXTERNAL ROTATION SHOULDER LEF   POSITIVE PAINFUL ARC   DISAPPEARED AFTER INJECTION   SEE DETAILS           Integument: inspection of skin, no rash, lesions; and, palpation, no induration, no  "tenderness.          Neurologic mental status, overall: alert and oriented; gait, no ataxia, no unsteadiness; coordination, no tremors; cranial nerves, overall: normal motor, overall: normal bulk, tone.          Psychiatric: orientation/consciousness, overall: oriented to person, place and time; behavior/psychomotor activity, no tics, normal psychomotor activity; mood and affect, overall: normal mood and affect; appearance, overall: well-groomed, good eye contact; speech, overall: normal quality, no aphasia and normal quality, quantity, intact.        Diagnostic Test Results:  Results for orders placed or performed in visit on 09/20/17   Basic metabolic panel   Result Value Ref Range    Sodium 137 133 - 144 mmol/L    Potassium 4.7 3.4 - 5.3 mmol/L    Chloride 100 94 - 109 mmol/L    Carbon Dioxide 31 20 - 32 mmol/L    Anion Gap 6 3 - 14 mmol/L    Glucose 97 70 - 99 mg/dL    Urea Nitrogen 11 7 - 30 mg/dL    Creatinine 1.02 0.66 - 1.25 mg/dL    GFR Estimate 77 >60 mL/min/1.7m2    GFR Estimate If [place] >90 >60 mL/min/1.7m2    Calcium 9.2 8.5 - 10.1 mg/dL           ICD-10-CM    1. Impingement syndrome, shoulder, left M75.42    2. Screen for colon cancer Z12.11    3. Screening for HIV (human immunodeficiency virus) Z11.4 HIV Screening   4. Testicular hypofunction E29.1 testosterone cypionate (DEPOTESTOTERONE) 200 MG/ML injection   5. Morbid obesity (H) E66.01 Hemoglobin A1c   6. Gastroesophageal reflux disease without esophagitis K21.9    7. Prostatic hypertrophy N40.0    8. Chronic bacterial conjunctivitis of left eye H10.402 sulfacetamide (BLEPH-10) 10 % ophthalmic solution   9. Hyperlipidemia LDL goal <100 E78.5 Lipid panel reflex to direct LDL Fasting   10. H/O syncope Z87.898     heart related has has defibrillator  x 8 years 2010 needing new battery pack in near [facility]    11. Hypogonadism male E29.1 Syringe/Needle, Disp, 20G X 1\" 1 ML MISC   12. Essential hypertension I10 amLODIPine (NORVASC) 10 MG tablet   13. " Chronic midline low back pain without sciatica M54.5     G89.29    14. Benign hypertension I10 metoprolol succinate (TOPROL-XL) 50 MG 24 hr tablet              .    Side effects benefits and risks thoroughly discussed. .he may come in early if unimproved or getting worse          Please drink 2 glasses of water prior to meals and walk 15-30 minutes after meals        I spent  25 MINUTES SPENT    with patient discussing the following issues   The primary encounter diagnosis was Impingement syndrome, shoulder, left. Diagnoses of Screen for colon cancer, Screening for HIV (human immunodeficiency virus), Testicular hypofunction, Morbid obesity (H), Gastroesophageal reflux disease without esophagitis, Prostatic hypertrophy, Chronic bacterial conjunctivitis of left eye, Hyperlipidemia LDL goal <100, H/O syncope, Hypogonadism male, Essential hypertension, Chronic midline low back pain without sciatica, and Benign hypertension were also pertinent to this visit. over half of which involved counseling and coordination of care.      Patient Instructions   (M75.42) Impingement syndrome, shoulder, left  (primary encounter diagnosis)  Comment:    Plan:  CODMAN'S EXERCISES  ,THAT IS PENDULUM RANGE OF MOTION 30 EACH TWICE DAILY  THUMB UP EXERCISES INTO PAIN 30 EACH TWICE DAILY  INTERNAL  AND EXTERNAL ROTATION  with AND WITHOUT RESISTANCE OR WEIGHT 30 EACH TWICE DAILY  SWORD SHEATH EXERCISE 30 EACH TWICE DAILY  WALL STRETCH EXERCISE 30 SECONDS EACH TWICE DAILY  POSTERIOR SHOULDER STRETCH AND HOLD 3 10 SECOND STRETCHES TWICE DAILY  ISOMETRIC EXERCISE 60 DEGREES ABDUCTION, ADDUCTION, 90 DEGREE THUMB UP POSITION  AVOID PAINFUL ARC  ICE TWICE DAILY X 5 MINUTES PRIOR TO EXERCISE OR AS NEEDED FOR PAIN CONTROL    TREATMENT PROGNOSIS BENEFITS AND RISKS DISCUSSED   ANATOMIC SITE:  Left posterior shoulder   PROCEDURE:  Injection   BETADYNE CLEANSING WITHOUT COMPLICATION   1:1 MIXTURE KENALOG 1% LIDOCAINE WITHOUT COMPLICATION   AMOUNT OF  KENALO mg thee  NDC NUMBER [id number]  INJECTION WITH NO TOUCH TECHNIQUE  SIDE EFFECTS BENEFITS AND RISKS DISCUSSED    TREATMENT PROGNOSIS BENEFITS AND RISKS DISCUSSED   MONITOR FOR ALLERGIC AND VASCULAR SIDE EFFECTS  MEDICATION RISKS SIDE EFFECTS BENEFITS AND RISKS DISCUSSED   ELLIOT MYERS JR., MD   18          (Z12.11) Screen for colon cancer  Comment:    Plan:      (Z11.4) Screening for HIV (human immunodeficiency virus)  Comment:    Plan: HIV Screening             (E29.1) Testicular hypofunction  Comment:    Plan:      (E66.01) Morbid obesity (H)  Comment:         Plan: Hemoglobin A1c             (K21.9) Gastroesophageal reflux disease without esophagitis  Comment:    Plan:      (N40.0) Prostatic hypertrophy  Comment:    Plan:      (H10.402) Chronic bacterial conjunctivitis of left eye  Comment:    Plan: sulfacetamide (BLEPH-10) 10 % ophthalmic         solution             (E78.5) Hyperlipidemia LDL goal <100  Comment:    Plan: Lipid panel reflex to direct LDL Fasting             (Z87.898) H/O syncope  Comment: heart related has has defibrillator  x 8 years 2010 needing new battery pack in near [facility]   Plan:                 ALL THE ABOVE PROBLEMS ARE STABLE AND MED CHANGES AS NOTED        Diet: mediterranean diet         Exercise:  Shoulder exercises   Exercises Range of motion, balance, isometric, and strengthening exercises 30 repetitions twice daily of involved joints            .ELLIOT MYERS MD 2018 11:32 AM  2018

## 2018-08-22 ASSESSMENT — PATIENT HEALTH QUESTIONNAIRE - PHQ9: SUM OF ALL RESPONSES TO PHQ QUESTIONS 1-9: 0

## 2018-08-22 ASSESSMENT — ANXIETY QUESTIONNAIRES: GAD7 TOTAL SCORE: 0

## 2018-08-28 ENCOUNTER — TELEPHONE (OUTPATIENT)
Dept: FAMILY MEDICINE | Facility: CLINIC | Age: 53
End: 2018-08-28

## 2018-08-28 NOTE — TELEPHONE ENCOUNTER
Prior Authorization Retail Medication Request    Medication/Dose: Testosterone Cypionate 200MG/ML solution  ICD code (if different than what is on RX):    Previously Tried and Failed:    Rationale:      Insurance Name:    Insurance ID:        Pharmacy Information (if different than what is on RX)  Name:    Phone:      PA started on CMM. Key: [id number]

## 2018-08-29 NOTE — TELEPHONE ENCOUNTER
Central Prior Authorization Team   Phone: [phone]    PA Initiation    Medication: Testosterone Cypionate 200MG/ML solution  Insurance Company: [employer] - Phone [phone] Fax [phone]  Pharmacy Filling the Rx: [address]  Filling Pharmacy Phone: [phone]  Filling Pharmacy Fax: [phone]  Start Date: 8/29/2018

## 2018-08-29 NOTE — TELEPHONE ENCOUNTER
Called pt to let him know that the Pa was initiated but will take at least 2 days to get a response. Pt will be called when/if approved. Left VM.

## 2018-08-30 NOTE — TELEPHONE ENCOUNTER
Prior Authorization Approval    Authorization Effective Date: 7/30/2018  Authorization Expiration Date: 8/28/2021  Medication: Testosterone Cypionate 200MG/ML solution-APPROVED  Approved Dose/Quantity:    Reference #: [id number]   Insurance Company: [employer] - Phone [phone] Fax [phone]  Expected CoPay:       CoPay Card Available:      Foundation Assistance Needed:    Which Pharmacy is filling the prescription (Not needed for infusion/clinic administered): [facility] [address] [place] [address]  Pharmacy Notified: Yes  Patient Notified: Yes

## 2018-09-06 ENCOUNTER — TELEPHONE (OUTPATIENT)
Dept: CARDIOLOGY | Facility: CLINIC | Age: 53
End: 2018-09-06

## 2018-09-06 NOTE — TELEPHONE ENCOUNTER
Pt calling to report beeping from his device. It was last checked in 1/2018. He had f/u with Dr Dill at the time and he recommended ICD gen change at [facility]. He will come in for ICD check to confirm [facility]  and f/u with NP for H/P for generator change. Returned call and left message for return call to schedule. He will need to come in separate days since the schedules are so full.

## 2018-10-30 DIAGNOSIS — K29.50 CHRONIC GASTRITIS WITHOUT BLEEDING, UNSPECIFIED GASTRITIS TYPE: ICD-10-CM

## 2018-10-30 NOTE — TELEPHONE ENCOUNTER
Prescription approved per [facility], [facility] or [facility] refill protocol.  Maribel Luis RN - Triage  [facility]

## 2018-10-30 NOTE — TELEPHONE ENCOUNTER
"Requested Prescriptions   Pending Prescriptions Disp Refills     ranitidine (ZANTAC) 150 MG tablet [Pharmacy Med Name: RANITIDINE 150 MG TABLET]  Last Written Prescription Date:  8/29/18  Last Fill Quantity: 60,  # refills: 1   Last office visit: 8/21/2018 with prescribing provider:  Sebatsian   Future Office Visit:     60 tablet 1     Sig: TAKE 1 TABLET BY MOUTH TWO TIMES A DAY    H2 Blockers Protocol Passed    10/30/2018 12:56 PM       Passed - Patient is age 12 or older       Passed - Recent (12 mo) or future (30 days) visit within the authorizing provider's specialty    Patient had office visit in the last 12 months or has a visit in the next 30 days with authorizing provider or within the authorizing provider's specialty.  See \"Patient Info\" tab in inbasket, or \"Choose Columns\" in Meds & Orders section of the refill encounter.                "

## 2019-02-20 DIAGNOSIS — E29.1 TESTICULAR HYPOFUNCTION: ICD-10-CM

## 2019-02-20 NOTE — TELEPHONE ENCOUNTER
Requested Prescriptions   Pending Prescriptions Disp Refills     testosterone cypionate (DEPOTESTOSTERONE) 200 MG/ML injection [Pharmacy Med Name: Testosterone Cypionate Intramuscular Solution 200 MG/ML]  Last Written Prescription Date:  8/21/2018  Last Fill Quantity: 10 mL,  # refills: 5   Last office visit: 8/21/2018 with prescribing provider:  NORRIS Cortes   Future Office Visit:     10 mL 4     Sig: INJECT 1 ML INTO THE MUSCLE EVERY 21 DAYS    Androgen Agents Failed - 2/20/2019  2:34 PM       Failed - Lipid panel on file in past 12 mos    Recent Labs   Lab Test 01/23/17  1126 02/24/15  1429   CHOL 173 163   TRIG 82 100   HDL 49 48   * 95   NHDL 124  --    VLDL  --  20   CHOLHDLRATIO  --  3.4              Failed - ALT on file within past 12 mos    Recent Labs   Lab Test 02/12/16  1304   ALT 58            Failed - HCT less than 54% on file within past 12 mos    Recent Labs   Lab Test 01/23/17  1126   HCT 42.8            Failed - Serum Testosterone on file within past 12 mos    Recent Labs   Lab Test 02/12/16  1304   TESTOSTTOTAL 159*            Failed - Serum PSA on file within past 12 mos    No results found for: PSA         Failed - Refills for this classification require provider review       Failed - Blood pressure under 140/90 in past 6 months    BP Readings from Last 3 Encounters:   08/21/18 138/76   01/17/18 136/90   09/20/17 (!) 140/92                Failed - Recent (6 mo) or future (30 days) visit within the authorizing provider's specialty       Failed - AST on file within past 12 mos    Recent Labs   Lab Test 02/12/16  1304   AST 48*            Passed - Patient is of age 12 and older       Passed - Medication is active on med list       Passed - Patient is not pregnant       Passed - No positive pregnancy test on file within past 12 mos

## 2019-02-21 RX ORDER — TESTOSTERONE CYPIONATE 200 MG/ML
INJECTION, SOLUTION INTRAMUSCULAR
Qty: 10 ML | Refills: 4 | Status: SHIPPED | OUTPATIENT
Start: 2019-02-21 | End: 2019-08-23

## 2019-02-27 ENCOUNTER — TELEPHONE (OUTPATIENT)
Dept: FAMILY MEDICINE | Facility: CLINIC | Age: 54
End: 2019-02-27

## 2019-02-27 NOTE — TELEPHONE ENCOUNTER
Prior Authorization Retail Medication Request    Medication/Dose:   ICD code (if different than what is on RX):     Previously Tried and Failed:     Rationale:       Insurance Name:     Insurance ID:         Pharmacy Information (if different than what is on RX)  Name:  [facility]    Phone:  [phone]  Formerly [facility]: [id number]

## 2019-03-05 NOTE — TELEPHONE ENCOUNTER
Central Prior Authorization Team   Phone: [phone]    PA Initiation    Medication: testosterone cypionate (DEPOTESTOSTERONE) 200 MG/ML injection  Insurance Company: [employer] - Phone [phone] Fax [phone]  Pharmacy Filling the Rx: [address]  Filling Pharmacy Phone: [phone]  Filling Pharmacy Fax: [phone]  Start Date: 3/5/2019

## 2019-03-08 NOTE — TELEPHONE ENCOUNTER
Prior Authorization Approval    Authorization Effective Date: 3/5/2019  Authorization Expiration Date: 3/5/2020  Medication: testosterone cypionate (DEPOTESTOSTERONE) 200 MG/ML injection-APPROVED  Approved Dose/Quantity:    Reference #:     Insurance Company: [employer] - Phone [phone] Fax [phone]  Expected CoPay:       CoPay Card Available:      Foundation Assistance Needed:    Which Pharmacy is filling the prescription (Not needed for infusion/clinic administered): [facility] [address] [place] [address]  Pharmacy Notified: Yes  Patient Notified: Yes

## 2019-05-16 ENCOUNTER — OFFICE VISIT (OUTPATIENT)
Dept: FAMILY MEDICINE | Facility: CLINIC | Age: 54
End: 2019-05-16
Payer: COMMERCIAL

## 2019-05-16 VITALS
OXYGEN SATURATION: 95 % | BODY MASS INDEX: 37.7 KG/M2 | DIASTOLIC BLOOD PRESSURE: 84 MMHG | TEMPERATURE: 98.5 F | HEART RATE: 80 BPM | WEIGHT: 278 LBS | SYSTOLIC BLOOD PRESSURE: 144 MMHG | RESPIRATION RATE: 16 BRPM

## 2019-05-16 DIAGNOSIS — M25.512 PAIN IN JOINT OF LEFT SHOULDER: ICD-10-CM

## 2019-05-16 DIAGNOSIS — I10 ESSENTIAL HYPERTENSION: ICD-10-CM

## 2019-05-16 DIAGNOSIS — G25.81 RESTLESS LEG SYNDROME: ICD-10-CM

## 2019-05-16 DIAGNOSIS — E78.5 HYPERLIPIDEMIA LDL GOAL <130: ICD-10-CM

## 2019-05-16 DIAGNOSIS — M75.42 IMPINGEMENT SYNDROME, SHOULDER, LEFT: Primary | ICD-10-CM

## 2019-05-16 DIAGNOSIS — Z12.11 SCREEN FOR COLON CANCER: ICD-10-CM

## 2019-05-16 DIAGNOSIS — Z11.4 SCREENING FOR HIV (HUMAN IMMUNODEFICIENCY VIRUS): ICD-10-CM

## 2019-05-16 PROCEDURE — 99214 OFFICE O/P EST MOD 30 MIN: CPT | Mod: 25 | Performed by: FAMILY MEDICINE

## 2019-05-16 PROCEDURE — 36415 COLL VENOUS BLD VENIPUNCTURE: CPT | Performed by: FAMILY MEDICINE

## 2019-05-16 PROCEDURE — 20610 DRAIN/INJ JOINT/BURSA W/O US: CPT | Performed by: FAMILY MEDICINE

## 2019-05-16 PROCEDURE — 87389 HIV-1 AG W/HIV-1&-2 AB AG IA: CPT | Performed by: FAMILY MEDICINE

## 2019-05-16 PROCEDURE — 80061 LIPID PANEL: CPT | Performed by: FAMILY MEDICINE

## 2019-05-16 RX ORDER — TRIAMCINOLONE ACETONIDE 40 MG/ML
40 INJECTION, SUSPENSION INTRA-ARTICULAR; INTRAMUSCULAR ONCE
Status: DISCONTINUED | OUTPATIENT
Start: 2019-05-16 | End: 2023-12-15

## 2019-05-16 RX ORDER — AMLODIPINE BESYLATE 10 MG/1
10 TABLET ORAL DAILY
Qty: 90 TABLET | Refills: 3 | Status: SHIPPED | OUTPATIENT
Start: 2019-05-16 | End: 2020-11-04 | Stop reason: DRUGHIGH

## 2019-05-16 RX ORDER — CARBIDOPA/LEVODOPA 25MG-250MG
1 TABLET ORAL AT BEDTIME
Qty: 30 TABLET | Refills: 11 | Status: SHIPPED | OUTPATIENT
Start: 2019-05-16 | End: 2023-12-15

## 2019-05-16 ASSESSMENT — ANXIETY QUESTIONNAIRES
5. BEING SO RESTLESS THAT IT IS HARD TO SIT STILL: NOT AT ALL
GAD7 TOTAL SCORE: 3
3. WORRYING TOO MUCH ABOUT DIFFERENT THINGS: NOT AT ALL
2. NOT BEING ABLE TO STOP OR CONTROL WORRYING: NOT AT ALL
1. FEELING NERVOUS, ANXIOUS, OR ON EDGE: NEARLY EVERY DAY
IF YOU CHECKED OFF ANY PROBLEMS ON THIS QUESTIONNAIRE, HOW DIFFICULT HAVE THESE PROBLEMS MADE IT FOR YOU TO DO YOUR WORK, TAKE CARE OF THINGS AT HOME, OR GET ALONG WITH OTHER PEOPLE: SOMEWHAT DIFFICULT
7. FEELING AFRAID AS IF SOMETHING AWFUL MIGHT HAPPEN: NOT AT ALL
6. BECOMING EASILY ANNOYED OR IRRITABLE: NOT AT ALL

## 2019-05-16 ASSESSMENT — PATIENT HEALTH QUESTIONNAIRE - PHQ9
SUM OF ALL RESPONSES TO PHQ QUESTIONS 1-9: 7
5. POOR APPETITE OR OVEREATING: NOT AT ALL

## 2019-05-16 NOTE — PROGRESS NOTES
"  SUBJECTIVE:   Dion Yepez is a 53 year old male who presents to clinic today for the following   health issues:      Hypertension Follow-up      Outpatient blood pressures are not being checked.    Low Salt Diet: not monitoring salt      Amount of exercise or physical activity: 2-3 days/week for an average of 30-45 minutes    Problems taking medications regularly: recently started adjusting the BP med    Medication side effects: none    Diet: regular (no restrictions)        Left shoulder pain      Duration: years    Description (location/character/radiation): left sholder    Intensity:  7/10 when trying to do things    Accompanying signs and symptoms: pain stays in shoulder    History (similar episodes/previous evaluation): yes    Precipitating or alleviating factors: frozen shoulder 15 years ago    Therapies tried and outcome: cortisone injection worked for 7 months       .ELLIOT MYERS MD .5/16/2019 1:33 PM .May 16, 2019      DEPRESSION   OVEREATING  CHRONIC LOWER BACK PAIN MANY   LOW FUNCTIONING TESTES   GASTROESOPHAGEAL REFLUX DISEASE WITHOUT ESOPHAGITIS   LDL OR \"BAD\" CHOLESTEROL  GOAL < 100   ON TESTOSTERONE SHOTS Q 3 WEEKS   ANXIETY AND DEPRESSION   SEPARATION FROM WIFE  SEXUAL DYSFUNCTION   PRESBYOPIA AND MYOPIA  PANIC EPISODES AT TIMES   RESTLESS LEG SYNDROME   MORBID OBESITY   HISTORY OF DEFRBRILLATOR IMPLANTED             Topic Date Due     DEPRESSION ACTION PLAN  09/02/1983     COLON CANCER SCREEN (SYSTEM ASSIGNED)  09/02/2015     LIPID MONITORING Q1 YEAR  01/23/2018     PHQ-9 Q6 MONTHS  02/21/2019               .  Current Outpatient Medications   Medication Sig Dispense Refill     amLODIPine (NORVASC) 10 MG tablet Take 1 tablet (10 mg) by mouth daily 90 tablet 3     amLODIPine (NORVASC) 5 MG tablet TAKE 1 TABLET BY MOUTH EVERY DAY 30 tablet 6     B-D LUER-TONI SYRINGE 20G X 1\" 3 ML MISC        Blood Pressure Monitoring (ADULT BLOOD PRESSURE CUFF LG) KIT One unit 1 kit 0     " "carbidopa-levodopa (SINEMET)  MG tablet Take 1 tablet by mouth At Bedtime 30 tablet 11     cyclobenzaprine (FLEXERIL) 10 MG tablet TAKE 1/2 TO 1 TABLET BY MOUTH THREE TIMES A DAY AS NEEDED FOR MUSCLE SPASMS 30 tablet 1     gabapentin (NEURONTIN) 300 MG capsule TAKE 1 CAPSULE BY MOUTH twice daily and take 2 at bedtime 120 capsule 3     ibuprofen (ADVIL/MOTRIN) 800 MG tablet TAKE 1 TABLET BY MOUTH EVERY 8 HOURS AS NEEDED FOR PAIN 90 tablet 9     lisinopril (PRINIVIL/ZESTRIL) 20 MG tablet TAKE 1 TABLET BY MOUTH DAILY 30 tablet 3     metoprolol succinate (TOPROL-XL) 50 MG 24 hr tablet Take 1 tablet (50 mg) by mouth daily 90 tablet 1     omeprazole (PRILOSEC) 40 MG capsule TAKE 1 CAPSULE BY MOUTH EVERY DAY 30 capsule 5     order for DME 1 proten shake a day       ranitidine (ZANTAC) 150 MG tablet TAKE 1 TABLET BY MOUTH TWO TIMES A DAY 90 tablet 0     SUBOXONE 8-2 MG film Place 1 Film under the tongue 3 times daily   0     Syringe/Needle, Disp, (SYRINGE LUER LOCK) 23G X 1\" 3 ML MISC 1 Syringe every 21 days 12 each 6     Syringe/Needle, Disp, 20G X 1\" 1 ML MISC Inject 1 mL into the muscle every 21 days 12 each 11     testosterone cypionate (DEPOTESTOSTERONE) 200 MG/ML injection INJECT 1 ML INTO THE MUSCLE EVERY 21 DAYS 10 mL 4     amLODIPine (NORVASC) 10 MG tablet Take 1 tablet (10 mg) by mouth daily (Patient not taking: Reported on 5/16/2019) 90 tablet 3     cimetidine (TAGAMET) 300 MG tablet Take 1 tablet (300 mg) by mouth At Bedtime (Patient not taking: Reported on 8/21/2018) 90 tablet 1            Allergies   Allergen Reactions     Methadone      Prolonged qt interval, temporary pacemaker         Immunization History   Administered Date(s) Administered     Influenza (IIV3) PF 09/22/2009, 11/22/2011, 11/21/2012, 10/10/2013     Influenza Vaccine IM 3yrs+ 4 Valent IIV4 11/19/2014, 10/12/2016     TDAP Vaccine (Adacel) 09/22/2009               reports that he does not drink alcohol.        reports that he does not use " drugs.      family history includes Cancer - colorectal in his father, paternal aunt, paternal grandfather, paternal grandmother, and paternal uncle; Thyroid Disease in his mother.      indicated that the status of his no family hx of is unknown. He indicated that the status of his mother is unknown. He indicated that the status of his father is unknown. He indicated that the status of his paternal grandmother is unknown. He indicated that the status of his paternal grandfather is unknown. He indicated that the status of his paternal aunt is unknown. He indicated that the status of his paternal uncle is unknown.         has a past surgical history that includes REDUCE TESTIS TORSION (1978); Implant implantable cardioverter defibrillator (12/2010); and Heart Cath Left heart cath (12/2010).       reports that he currently engages in sexual activity and has had partners who are Female.    .  Pediatric History   Patient Guardian Status     Not on file     Other Topics Concern     Parent/sibling w/ CABG, MI or angioplasty before 65F 55M? Not Asked      Service Not Asked     Blood Transfusions Not Asked     Caffeine Concern No     Comment: coffee: 1 cup a day     Occupational Exposure Not Asked     Hobby Hazards Not Asked     Sleep Concern No     Stress Concern No     Weight Concern No     Special Diet No     Back Care Not Asked     Exercise No     Comment: not due to back pain     Bike Helmet Not Asked     Seat Belt Yes     Self-Exams Not Asked   Social History Narrative     Not on file             reports that he has never smoked. He has never used smokeless tobacco.        Medical, social, surgical, and family histories reviewed.        Labs reviewed in EPIC  Patient Active Problem List   Diagnosis     Chronic Back Pain     Testicular hypofunction     GERD (gastroesophageal reflux disease)     HYPERLIPIDEMIA LDL GOAL <130     Anxiety     Benign hypertension     Backache     Major depressive disorder with single  "episode, in full remission (H)     Impotence of organic origin     Myopia     Panic attacks     Prostatic hypertrophy     Regular astigmatism     Testosterone deficiency     Essential hypertension     Morbid obesity (H)     H/O syncope       Past Surgical History:   Procedure Laterality Date     C REDUCE TESTIS TORSION  1978     HEART CATH LEFT HEART CATH  12/2010    [facility] - normal coronary arteries      IMPLANT IMPLANTABLE CARDIOVERTER DEFIBRILLATOR  12/2010         Social History     Tobacco Use     Smoking status: Never Smoker     Smokeless tobacco: Never Used   Substance Use Topics     Alcohol use: No     Alcohol/week: 0.0 oz       Family History   Problem Relation Age of Onset     Thyroid Disease Mother      Cancer - colorectal Father      Cancer - colorectal Paternal Grandmother      Cancer - colorectal Paternal Grandfather      Cancer - colorectal Paternal Aunt      Cancer - colorectal Paternal Uncle      Diabetes No family hx of      Coronary Artery Disease No family hx of      Hypertension No family hx of      Hyperlipidemia No family hx of      Cerebrovascular Disease No family hx of      Breast Cancer No family hx of      Colon Cancer No family hx of      Prostate Cancer No family hx of      Other Cancer No family hx of      Depression No family hx of      Anxiety Disorder No family hx of      Mental Illness No family hx of      Substance Abuse No family hx of      Anesthesia Reaction No family hx of      Asthma No family hx of      Osteoporosis No family hx of      Genetic Disorder No family hx of      Obesity No family hx of      Unknown/Adopted No family hx of              Current Outpatient Medications   Medication Sig Dispense Refill     amLODIPine (NORVASC) 10 MG tablet Take 1 tablet (10 mg) by mouth daily 90 tablet 3     amLODIPine (NORVASC) 5 MG tablet TAKE 1 TABLET BY MOUTH EVERY DAY 30 tablet 6     B-D LUER-TONI SYRINGE 20G X 1\" 3 ML MISC        Blood Pressure Monitoring (ADULT BLOOD " "PRESSURE CUFF LG) KIT One unit 1 kit 0     carbidopa-levodopa (SINEMET)  MG tablet Take 1 tablet by mouth At Bedtime 30 tablet 11     cyclobenzaprine (FLEXERIL) 10 MG tablet TAKE 1/2 TO 1 TABLET BY MOUTH THREE TIMES A DAY AS NEEDED FOR MUSCLE SPASMS 30 tablet 1     gabapentin (NEURONTIN) 300 MG capsule TAKE 1 CAPSULE BY MOUTH twice daily and take 2 at bedtime 120 capsule 3     ibuprofen (ADVIL/MOTRIN) 800 MG tablet TAKE 1 TABLET BY MOUTH EVERY 8 HOURS AS NEEDED FOR PAIN 90 tablet 9     lisinopril (PRINIVIL/ZESTRIL) 20 MG tablet TAKE 1 TABLET BY MOUTH DAILY 30 tablet 3     metoprolol succinate (TOPROL-XL) 50 MG 24 hr tablet Take 1 tablet (50 mg) by mouth daily 90 tablet 1     omeprazole (PRILOSEC) 40 MG capsule TAKE 1 CAPSULE BY MOUTH EVERY DAY 30 capsule 5     order for DME 1 proten shake a day       ranitidine (ZANTAC) 150 MG tablet TAKE 1 TABLET BY MOUTH TWO TIMES A DAY 90 tablet 0     SUBOXONE 8-2 MG film Place 1 Film under the tongue 3 times daily   0     Syringe/Needle, Disp, (SYRINGE LUER LOCK) 23G X 1\" 3 ML MISC 1 Syringe every 21 days 12 each 6     Syringe/Needle, Disp, 20G X 1\" 1 ML MISC Inject 1 mL into the muscle every 21 days 12 each 11     testosterone cypionate (DEPOTESTOSTERONE) 200 MG/ML injection INJECT 1 ML INTO THE MUSCLE EVERY 21 DAYS 10 mL 4     amLODIPine (NORVASC) 10 MG tablet Take 1 tablet (10 mg) by mouth daily (Patient not taking: Reported on 5/16/2019) 90 tablet 3     cimetidine (TAGAMET) 300 MG tablet Take 1 tablet (300 mg) by mouth At Bedtime (Patient not taking: Reported on 8/21/2018) 90 tablet 1         Recent Labs   Lab Test 09/20/17  1401 01/23/17  1126 02/12/16  1304 02/25/15  1410 02/24/15  1429 12/16/13  1126   LDL  --  108*  --   --  95 106   HDL  --  49  --   --  48 64   TRIG  --  82  --   --  100 73   ALT  --   --  58 29  --   --    CR 1.02 0.85 0.80 1.20  --   --    GFRESTIMATED 77 >90  Non  GFR Calc   >90 64  --   --    GFRESTBLACK >90 >90   " American GFR Calc   >90 78  --   --    POTASSIUM 4.7 4.7 4.8 4.4  --   --    TSH  --  3.83  --   --   --   --             BP Readings from Last 6 Encounters:   05/16/19 144/84   08/21/18 138/76   01/17/18 136/90   09/20/17 (!) 140/92   05/22/17 136/84   03/06/17 (!) 142/94         Wt Readings from Last 3 Encounters:   05/16/19 126.1 kg (278 lb)   08/21/18 119.7 kg (264 lb)   01/17/18 118.8 kg (262 lb)               Positive symptoms or findings indicated by bold designation:         ROS: 10 point ROS neg other than the symptoms noted above in the HPI.except  has Chronic Back Pain; Testicular hypofunction; GERD (gastroesophageal reflux disease); HYPERLIPIDEMIA LDL GOAL <130; Anxiety; Benign hypertension; Backache; Major depressive disorder with single episode, in full remission (H); Impotence of organic origin; Myopia; Panic attacks; Prostatic hypertrophy; Regular astigmatism; Testosterone deficiency; Essential hypertension; Morbid obesity (H); and H/O syncope on their problem list.  Review Of Systems    Skin: negative    Eyes: negative,  VISION AND READERS AND DISTANCE     Ears/Nose/Throat: negative    Respiratory: No shortness of breath, dyspnea on exertion, cough, or hemoptysis    Cardiovascular:  DEFIBRILLATOR    Gastrointestinal: negative    Genitourinary: negative    Musculoskeletal: negative    Neurologic: negative    Psychiatric: negative    Hematologic/Lymphatic/Immunologic: negative    Endocrine: negative                PE:  /84   Pulse 80   Temp 98.5  F (36.9  C) (Tympanic)   Resp 16   Wt 126.1 kg (278 lb)   SpO2 95%   BMI 37.70 kg/m   Body mass index is 37.7 kg/m .        Constitutional: general appearance, well nourished, well developed, in no acute distress, well developed, appears stated age, normal body habitus,          Eyes:; The patient has normal eyelids sclerae and conjunctivae :          Ears/Nose/Throat: external ear, overall: normal appearance; external nose, overall: benign  appearance, normal moujth gums and lips           Neck: thyroid, overall: normal size, normal consistency, nontender,          Respiratory:  palpation of chest, overall: normal excursion,    Clear to percussion and auscultation     NO Tachypnea    NORMAL  Color          Cardiovascular:  Good color with no peripheral edema    Regular sinus rhythm without murmur.  Physiologic heart sounds   Heart is unelarged    .     Chest/Breast: normal shape           Abdominal exam,  Liver and spleen are  unenlarged        Tenderness    Scars      CENTRAL OBESITY         Urogenital; no renal, flank or bladder  tenderness;          Lymphatic: neck nodes,     Other nodes          Musculoskeletal:  Brief ortho exam normal except:  PAIN PAINFUL ARC LEFT SHOULDER     PAIN INTERNAL  AND EXTERNAL ROTATION SIDE EFFECTS BENEFITS AND RISKS DISCUSSED           Integument: inspection of skin, no rash, lesions; and, palpation, no induration, no tenderness.          Neurologic mental status, overall: alert and oriented; gait, no ataxia, no unsteadiness; coordination, no tremors; cranial nerves, overall: normal motor, overall: normal bulk, tone.          Psychiatric: orientation/consciousness, overall: oriented to person, place and time; behavior/psychomotor activity, no tics, normal psychomotor activity; mood and affect, overall: normal mood and affect; appearance, overall: well-groomed, good eye contact; speech, overall: normal quality, no aphasia and normal quality, quantity, intact.        Diagnostic Test Results:  Results for orders placed or performed in visit on 09/20/17   Basic metabolic panel   Result Value Ref Range    Sodium 137 133 - 144 mmol/L    Potassium 4.7 3.4 - 5.3 mmol/L    Chloride 100 94 - 109 mmol/L    Carbon Dioxide 31 20 - 32 mmol/L    Anion Gap 6 3 - 14 mmol/L    Glucose 97 70 - 99 mg/dL    Urea Nitrogen 11 7 - 30 mg/dL    Creatinine 1.02 0.66 - 1.25 mg/dL    GFR Estimate 77 >60 mL/min/1.7m2    GFR Estimate If Black >90  >60 mL/min/1.7m2    Calcium 9.2 8.5 - 10.1 mg/dL           ICD-10-CM    1. Impingement syndrome, shoulder, left M75.42    2. Essential hypertension I10 amLODIPine (NORVASC) 10 MG tablet   3. Screen for colon cancer Z12.11    4. Screening for HIV (human immunodeficiency virus) Z11.4 HIV Screening   5. Hyperlipidemia LDL goal <130 E78.5 Lipid panel reflex to direct LDL Fasting   6. Restless leg syndrome G25.81 carbidopa-levodopa (SINEMET)  MG tablet              .    Side effects benefits and risks thoroughly discussed. .he may come in early if unimproved or getting worse          Please drink 2 glasses of water prior to meals and walk 15-30 minutes after meals        I spent 25 MINUTES SPENT    with patient discussing the following issues  AS TOLERATED  The primary encounter diagnosis was Impingement syndrome, shoulder, left. Diagnoses of Essential hypertension, Screen for colon cancer, Screening for HIV (human immunodeficiency virus), Hyperlipidemia LDL goal <130, and Restless leg syndrome were also pertinent to this visit. over half of which involved counseling and coordination of care.      Patient Instructions   (M75.42) Impingement syndrome, shoulder, left  (primary encounter diagnosis)  Comment:    Plan:  CODMAN'S EXERCISES  ,THAT IS PENDULUM RANGE OF MOTION 30 EACH TWICE DAILY  THUMB UP EXERCISES INTO PAIN 30 EACH TWICE DAILY  INTERNAL  AND EXTERNAL ROTATION  with AND WITHOUT RESISTANCE OR WEIGHT 30 EACH TWICE DAILY  SWORD SHEATH EXERCISE 30 EACH TWICE DAILY  WALL STRETCH EXERCISE 30 SECONDS EACH TWICE DAILY  POSTERIOR SHOULDER STRETCH AND HOLD 3 10 SECOND STRETCHES TWICE DAILY  ISOMETRIC EXERCISE 60 DEGREES ABDUCTION, ADDUCTION, 90 DEGREE THUMB UP POSITION  AVOID PAINFUL ARC  ICE TWICE DAILY X 5 MINUTES PRIOR TO EXERCISE OR AS NEEDED FOR PAIN CONTROL  TREATMENT PROGNOSIS BENEFITS AND RISKS DISCUSSED   ANATOMIC SITE:  KENALOG 60MG   PROCEDURE:  LEFT POSTERIOR   BETADYNE CLEANSING WITHOUT COMPLICATION    1:1 MIXTURE KENALOG 1% LIDOCAINE WITHOUT COMPLICATION   AMOUNT OF KENALOMG   NDC NUMBER KENALOG:  [id number]  LIDOCAINE 58930-606-83  INJECTION WITH NO TOUCH TECHNIQUE  SIDE EFFECTS BENEFITS AND RISKS DISCUSSED    TREATMENT PROGNOSIS BENEFITS AND RISKS DISCUSSED   MONITOR FOR ALLERGIC AND VASCULAR SIDE EFFECTS  MEDICATION RISKS SIDE EFFECTS BENEFITS AND RISKS DISCUSSED   ELLIOT MYERS JR., MD   19          (I10) Essential hypertension  Comment:    Plan: amLODIPine (NORVASC) 10 MG tablet             (Z12.11) Screen for colon cancer  Comment:    Plan:      (Z11.4) Screening for HIV (human immunodeficiency virus)  Comment:    Plan: HIV Screening             (E78.5) Hyperlipidemia LDL goal <130  Comment:    Plan: Lipid panel reflex to direct LDL Fasting             (G25.81) Restless leg syndrome  Comment:    Plan: carbidopa-levodopa (SINEMET)  MG tablet                           ALL THE ABOVE PROBLEMS ARE STABLE AND MED CHANGES AS NOTED        Diet:  MEDITERRANEAN DIET         Exercise:  AS TOLERATED LEFT SHOULDER   Exercises Range of motion, balance, isometric, and strengthening exercises 30 repetitions twice daily of involved joints            .ELLIOT MYERS MD 2019 1:33 PM  May 16, 2019

## 2019-05-16 NOTE — LETTER
"May 20, 2019      Dion Yepez  [address]  [facility] [address]        Dear ,    We are writing to inform you of your test results.    NORMAL HUMAN IMMUNO VIRUS   NORMAL TOTAL CHOLESTEROL   NORMAL TRBIGLYCERIDES   NORMAL HDL OR \"GOOD\" CHOLESTEROL   BORDERLINE  LDL OR \"BAD\" CHOLESTEROL   BORDERLINE  VERY LOW DENSITY CHOLESTEROL   FOLLOW MEDITERRANEAN DIET      What You Can Do to Reduce Cholesterol Levels   and Reduce Risk of Diabetes, Heart, and Blood Vessel Disease   1. Eat six to eight servings of green or root vegetables or fruit (raw or cooked) per day. You need 35 grams of fiber per day.   Examples: carrots apples   broccoli oranges   spinach grapefruit   lettuce pears   bananas   2. Use up to 2 cup of walnuts, almonds, or pecans as a source of \"good\" fat per day.   3. Drink one to three servings of soy milk (great for cereal) or 2 cup of soynuts per day.   4. Use margarine with reduced trans or saturated fats (e.g. Benecol, Smart Balance, olive oil margarine) as replacement for butter or margarine.   5. Eat fewer or half-portions of desserts, baked goods, chips, cookies, processed flour and sugar, pasta, butter, cream, non-skim dairy, ice cream.   6. Use olive or canola oil as the only oils for cooking and dressings.   7. Use one tablespoon of ground flaxseed or Natural Ovens \"Energy Mix\" per day (great on cereal or over salad).   8. Eat one to two servings per week of wild salmon or water-packed tuna.   9. Limit beef, lamb, and pork to at most one serving per day. (Range-fed beef, if you can get it, is much preferred and allows for greater use in diet).   10. Eat three to four servings per day of whole grains (legumes, rice, wheat, oats).   11. Limit sodas and beer at most to three per week (only special occasions).   12. 65 percent of us need to lose weight and all of us need to keep moving! You should be walking at least 150 minutes per week. You should lose approximately seven " percent of your weight in the next year if overweight. Check with me for more details.   1. Andrew Weil's paperback book, The Healthy Kitchen, is a great addition to your healthy lifestyle library.   2. American Diabetic Association (www.diabetes.org) - a wealth of information on nutritional excellence.   3. Other great websites for Mediterranean diets are available.     Resulted Orders   HIV Screening   Result Value Ref Range    HIV Antigen Antibody Combo Nonreactive NR^Nonreactive          Comment:      HIV-1 p24 Ag & HIV-1/HIV-2 Ab Not Detected   Lipid panel reflex to direct LDL Fasting   Result Value Ref Range    Cholesterol 173 <200 mg/dL    Triglycerides 69 <150 mg/dL    HDL Cholesterol 43 >39 mg/dL    LDL Cholesterol Calculated 116 (H) <100 mg/dL      Comment:      Above desirable:  100-129 mg/dl  Borderline High:  130-159 mg/dL  High:             160-189 mg/dL  Very high:       >189 mg/dl      Non HDL Cholesterol 130 (H) <130 mg/dL      Comment:      Above Desirable:  130-159 mg/dl  Borderline high:  160-189 mg/dl  High:             190-219 mg/dl  Very high:       >219 mg/dl         If you have any questions or concerns, please call the clinic at the number listed above.       Sincerely,        ELLIOT MYERS MD

## 2019-05-17 LAB
CHOLEST SERPL-MCNC: 173 MG/DL
HDLC SERPL-MCNC: 43 MG/DL
HIV 1+2 AB+HIV1 P24 AG SERPL QL IA: NONREACTIVE
LDLC SERPL CALC-MCNC: 116 MG/DL
NONHDLC SERPL-MCNC: 130 MG/DL
TRIGL SERPL-MCNC: 69 MG/DL

## 2019-05-17 ASSESSMENT — ANXIETY QUESTIONNAIRES: GAD7 TOTAL SCORE: 3

## 2019-06-10 DIAGNOSIS — G89.29 CHRONIC MIDLINE LOW BACK PAIN WITHOUT SCIATICA: ICD-10-CM

## 2019-06-10 DIAGNOSIS — M54.50 CHRONIC MIDLINE LOW BACK PAIN WITHOUT SCIATICA: ICD-10-CM

## 2019-06-11 NOTE — TELEPHONE ENCOUNTER
gabapentin (NEURONTIN)      Last Written Prescription Date:  12-22-17  Last Fill Quantity: 120cap,   # refills: 3  Last Office Visit: 5-16-19  Future Office visit:       Routing refill request to provider for review/approval because:  Drug not on the [facility], P or [facility] refill protocol or controlled substance

## 2019-06-12 RX ORDER — GABAPENTIN 300 MG/1
CAPSULE ORAL
Qty: 120 CAPSULE | Refills: 3 | Status: SHIPPED | OUTPATIENT
Start: 2019-06-12 | End: 2019-07-09

## 2019-06-12 RX ORDER — GABAPENTIN 300 MG/1
CAPSULE ORAL
Qty: 120 CAPSULE | Refills: 0 | OUTPATIENT
Start: 2019-06-12

## 2019-06-12 NOTE — TELEPHONE ENCOUNTER
Pt is at [facility] pharmacy now. His requesting status of gabapentin approval. Routing message to PCP for approval.     Last  check on 5/1/19, provider to review, patient getting Suboxone from Dr. Marley Collins     Last  check on 5/1/19, provider to review, patient getting Suboxone from Dr. Marley Collins

## 2019-06-12 NOTE — TELEPHONE ENCOUNTER
Rx approved and sent  I had to refuse the 1st Rx from the other pharmacy and then approve a new Rx for Cub

## 2019-06-13 ENCOUNTER — OFFICE VISIT (OUTPATIENT)
Dept: FAMILY MEDICINE | Facility: CLINIC | Age: 54
End: 2019-06-13
Payer: COMMERCIAL

## 2019-06-13 VITALS
SYSTOLIC BLOOD PRESSURE: 164 MMHG | TEMPERATURE: 98.9 F | HEART RATE: 84 BPM | RESPIRATION RATE: 20 BRPM | DIASTOLIC BLOOD PRESSURE: 92 MMHG | OXYGEN SATURATION: 97 %

## 2019-06-13 DIAGNOSIS — I10 ESSENTIAL HYPERTENSION: ICD-10-CM

## 2019-06-13 DIAGNOSIS — M25.562 ACUTE PAIN OF LEFT KNEE: Primary | ICD-10-CM

## 2019-06-13 DIAGNOSIS — Z12.11 SPECIAL SCREENING FOR MALIGNANT NEOPLASMS, COLON: ICD-10-CM

## 2019-06-13 DIAGNOSIS — I10 BENIGN HYPERTENSION: ICD-10-CM

## 2019-06-13 PROCEDURE — 99214 OFFICE O/P EST MOD 30 MIN: CPT | Performed by: FAMILY MEDICINE

## 2019-06-13 RX ORDER — NEBULIZER AND COMPRESSOR
EACH MISCELLANEOUS
Qty: 1 KIT | Refills: 0 | Status: SHIPPED | OUTPATIENT
Start: 2019-06-13

## 2019-06-13 RX ORDER — METHYLPREDNISOLONE 4 MG
TABLET, DOSE PACK ORAL
Qty: 21 TABLET | Refills: 0 | Status: SHIPPED | OUTPATIENT
Start: 2019-06-13 | End: 2021-01-13

## 2019-06-13 RX ORDER — METOPROLOL SUCCINATE 100 MG/1
100 TABLET, EXTENDED RELEASE ORAL DAILY
Qty: 30 TABLET | Refills: 1 | Status: SHIPPED | OUTPATIENT
Start: 2019-06-13 | End: 2019-09-12

## 2019-06-13 ASSESSMENT — PAIN SCALES - GENERAL: PAINLEVEL: MODERATE PAIN (5)

## 2019-06-13 NOTE — PATIENT INSTRUCTIONS
Alternate ice & heat.  Use Ice massage on trigger point areas.  Do gentle Range of motion exercises    Increase the Metoprolol to 100 mg daily, take 2 of the current 50 mg daily

## 2019-06-13 NOTE — PROGRESS NOTES
Subjective     Dion Yepez is a 53 year old male who presents to clinic today for the following health issues:    HPI     Musculoskeletal problem/pain      Duration: 3-4 days    Description  Location: LT knee    Intensity:  moderate, 5/10    Accompanying signs and symptoms: swelling    History  Previous similar problem: YES- years ago  Previous evaluation:  none    Precipitating or alleviating factors:  Trauma or overuse: YES- possible trauma playing softball on Monday  Aggravating factors include: standing, walking and climbing stairs    Therapies tried and outcome: acetaminophen and Ibuprofen    Hypertension Follow-up      Do you check your blood pressure regularly outside of the clinic? No     Are you following a low salt diet? Yes    Are your blood pressures ever more than 140 on the top number (systolic) OR more   than 90 on the bottom number (diastolic), for example 140/90? No    BP Readings from Last 3 Encounters:   06/13/19 (!) 164/92   05/16/19 144/84   08/21/18 138/76    Wt Readings from Last 3 Encounters:   05/16/19 126.1 kg (278 lb)   08/21/18 119.7 kg (264 lb)   01/17/18 118.8 kg (262 lb)            Pt has been under more stress in past month.  Wife has been found to have a mass on pancreas and they are still in process of getting that diagnosed           Reviewed and updated as needed this visit by Provider         Review of Systems   ROS COMP: CONSTITUTIONAL: NEGATIVE for fever, chills, change in weight  ENT/MOUTH: NEGATIVE for ear, mouth and throat problems  RESP: NEGATIVE for significant cough or SOB  CV: NEGATIVE for chest pain, palpitations or peripheral edema  MUSCULOSKELETAL: POSITIVE  for arthralgias Lt knee  PSYCHIATRIC: POSITIVE foranxiety, Hx anxiety and Hx depression      Objective    BP (!) 164/92 (BP Location: Left arm, Patient Position: Sitting, Cuff Size: Adult Large)   Pulse 84   Temp 98.9  F (37.2  C) (Temporal)   Resp 20   SpO2 97%   There is no height or weight on file  to calculate BMI.  Physical Exam   GENERAL: healthy, alert and no distress  NECK: no adenopathy, no asymmetry, masses, or scars and thyroid normal to palpation  RESP: lungs clear to auscultation - no rales, rhonchi or wheezes  CV: regular rate and rhythm, normal S1 S2, no S3 or S4, no murmur, click or rub, no peripheral edema and peripheral pulses strong  ABDOMEN: soft, nontender, no hepatosplenomegaly, no masses and bowel sounds normal  MS: LLE exam shows normal strength and muscle mass and tenderness at LCL, no effusion no instability   PSYCH: mentation appears normal, affect flat and anxious    Diagnostic Test Results:  Labs reviewed in Epic  none         Assessment & Plan     Edward was seen today for knee pain.    Diagnoses and all orders for this visit:    Acute pain of left knee  -     methylPREDNISolone (MEDROL DOSEPAK) 4 MG tablet therapy pack; Follow Package Directions    Benign hypertension    Essential hypertension  -     metoprolol succinate ER (TOPROL-XL) 100 MG 24 hr tablet; Take 1 tablet (100 mg) by mouth daily  -     Blood Pressure Monitoring (ADULT BLOOD PRESSURE CUFF LG) KIT; One unit    Special screening for malignant neoplasms, colon  -     GASTROENTEROLOGY ADULT REF PROCEDURE ONLY [facility] [phone]           FUTURE APPOINTMENTS:       - Follow-up visit in 1 mo  Referral placed to [facility] for colonoscopy scheduling  Patient Instructions   Alternate ice & heat.  Use Ice massage on trigger point areas.  Do gentle Range of motion exercises    Increase the Metoprolol to 100 mg daily, take 2 of the current 50 mg daily      Return in about 1 month (around 7/13/2019) for Hypertension, knee pain.    Rainer Castillo MD  [facility]

## 2019-07-08 DIAGNOSIS — M54.50 CHRONIC MIDLINE LOW BACK PAIN WITHOUT SCIATICA: ICD-10-CM

## 2019-07-08 DIAGNOSIS — G89.29 CHRONIC MIDLINE LOW BACK PAIN WITHOUT SCIATICA: ICD-10-CM

## 2019-07-08 NOTE — TELEPHONE ENCOUNTER
Reason for Call:  Medication or medication refill:    Do you use a [facility]?  Name of the pharmacy and phone number for the current request:  [facility]    Name of the medication requested: gabapentin (NEURONTIN) 300 MG capsule    Other request: none    Can we leave a detailed message on this number? YES    Phone number patient can be reached at: Home number on file [phone] (home)    Best Time: any    Call taken on 7/8/2019 at 1:45 PM by JOHN GRIMES

## 2019-07-09 RX ORDER — GABAPENTIN 300 MG/1
CAPSULE ORAL
Qty: 120 CAPSULE | Refills: 3 | Status: SHIPPED | OUTPATIENT
Start: 2019-07-09 | End: 2019-11-20

## 2019-07-09 NOTE — TELEPHONE ENCOUNTER
Controlled Substance Refill Request for gabapentin (NEURONTIN) 300 MG capsule  Problem List Complete:  No     PROVIDER TO CONSIDER COMPLETION OF PROBLEM LIST AND OVERVIEW/CONTROLLED SUBSTANCE AGREEMENT    Controlled substance agreement:   Encounter-Level CSA:    There are no encounter-level csa.     Patient-Level CSA:    There are no patient-level csa.         Last Urine Drug Screen: No results found for: CDAUT, No results found for: COMDAT, No results found for: THC13, PCP13, COC13, MAMP13, OPI13, AMP13, BZO13, TCA13, MTD13, BAR13, OXY13, PPX13, BUP13     Processing:  E-scibe     [email or web address]       checked in past 3 months?  Yes Last  check on 5/1/19, provider to review, patient getting Suboxone from Dr. Marley Collins

## 2019-07-09 NOTE — TELEPHONE ENCOUNTER
gabapentin (NEURONTIN)       Last Written Prescription Date:  6-12-19  Last Fill Quantity: 120cap,   # refills: 3  Last Office Visit: 6-13-19  Future Office visit:       Routing refill request to provider for review/approval because:  Drug not on the [facility], P or [facility] refill protocol or controlled substance

## 2019-08-23 DIAGNOSIS — E29.1 TESTICULAR HYPOFUNCTION: ICD-10-CM

## 2019-08-24 NOTE — TELEPHONE ENCOUNTER
Requested Prescriptions   Pending Prescriptions Disp Refills     testosterone cypionate (DEPOTESTOSTERONE) 200 MG/ML injection [Pharmacy Med Name: Testosterone Cypionate Intramuscular Solution 200 MG/ML] 4 mL 2     Sig: INJECT 1 ML INTO THE MUSCLE EVERY 21 DAYS  Last Written Prescription Date:  2/21/19  Last Fill Quantity: 10 mL,  # refills: 4   Last office visit: 6/13/2019 with prescribing provider:  Jonathan   Future Office Visit:           Androgen Agents Failed - 8/23/2019  5:53 PM        Failed - ALT on file within past 12 mos     Recent Labs   Lab Test 02/12/16  1304   ALT 58             Failed - HCT less than 54% on file within past 12 mos     Recent Labs   Lab Test 01/23/17  1126   HCT 42.8             Failed - Serum Testosterone on file within past 12 mos     Recent Labs   Lab Test 02/12/16  1304   TESTOSTTOTAL 159*             Failed - Serum PSA on file within past 12 mos     No results found for: PSA          Failed - Refills for this classification require provider review        Failed - Blood pressure under 140/90 in past 6 months     BP Readings from Last 3 Encounters:   06/13/19 (!) 164/92   05/16/19 144/84   08/21/18 138/76                 Failed - AST on file within past 12 mos     Recent Labs   Lab Test 02/12/16  1304   AST 48*             Passed - Patient is of age 12 and older        Passed - Lipid panel on file in past 12 mos     Recent Labs   Lab Test 05/16/19  1341  02/24/15  1429   CHOL 173   < > 163   TRIG 69   < > 100   HDL 43   < > 48   *   < > 95   NHDL 130*   < >  --    VLDL  --   --  20   CHOLHDLRATIO  --   --  3.4    < > = values in this interval not displayed.               Passed - Medication is active on med list        Passed - Patient is not pregnant        Passed - No positive pregnancy test on file within past 12 mos        Passed - Recent (6 mo) or future (30 days) visit within the authorizing provider's specialty

## 2019-08-26 RX ORDER — TESTOSTERONE CYPIONATE 200 MG/ML
INJECTION, SOLUTION INTRAMUSCULAR
Qty: 4 ML | Refills: 2 | Status: SHIPPED | OUTPATIENT
Start: 2019-08-26 | End: 2020-04-27

## 2019-10-14 ENCOUNTER — OFFICE VISIT (OUTPATIENT)
Dept: FAMILY MEDICINE | Facility: CLINIC | Age: 54
End: 2019-10-14
Payer: COMMERCIAL

## 2019-10-14 ENCOUNTER — ANCILLARY PROCEDURE (OUTPATIENT)
Dept: GENERAL RADIOLOGY | Facility: CLINIC | Age: 54
End: 2019-10-14
Attending: PHYSICIAN ASSISTANT
Payer: COMMERCIAL

## 2019-10-14 VITALS
RESPIRATION RATE: 16 BRPM | BODY MASS INDEX: 36.98 KG/M2 | WEIGHT: 273 LBS | HEART RATE: 78 BPM | SYSTOLIC BLOOD PRESSURE: 138 MMHG | DIASTOLIC BLOOD PRESSURE: 88 MMHG | HEIGHT: 72 IN | OXYGEN SATURATION: 97 %

## 2019-10-14 DIAGNOSIS — S89.91XA LEG INJURY, RIGHT, INITIAL ENCOUNTER: ICD-10-CM

## 2019-10-14 DIAGNOSIS — Z23 NEED FOR PROPHYLACTIC VACCINATION AND INOCULATION AGAINST INFLUENZA: ICD-10-CM

## 2019-10-14 DIAGNOSIS — S80.11XA CONTUSION OF RIGHT LOWER EXTREMITY, INITIAL ENCOUNTER: Primary | ICD-10-CM

## 2019-10-14 DIAGNOSIS — Z23 NEED FOR VACCINATION: ICD-10-CM
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
## 2019-10-14 PROCEDURE — 90472 IMMUNIZATION ADMIN EACH ADD: CPT | Performed by: PHYSICIAN ASSISTANT

## 2019-10-14 PROCEDURE — 73590 X-RAY EXAM OF LOWER LEG: CPT | Mod: RT

## 2019-10-14 PROCEDURE — 99213 OFFICE O/P EST LOW 20 MIN: CPT | Mod: 25 | Performed by: PHYSICIAN ASSISTANT

## 2019-10-14 PROCEDURE — 90715 TDAP VACCINE 7 YRS/> IM: CPT | Performed by: PHYSICIAN ASSISTANT

## 2019-10-14 PROCEDURE — 90682 RIV4 VACC RECOMBINANT DNA IM: CPT | Performed by: PHYSICIAN ASSISTANT

## 2019-10-14 PROCEDURE — 90471 IMMUNIZATION ADMIN: CPT | Performed by: PHYSICIAN ASSISTANT

## 2019-10-14 ASSESSMENT — MIFFLIN-ST. JEOR: SCORE: 2116.32

## 2019-10-14 NOTE — PROGRESS NOTES
Subjective     Dion Yepez is a 54 year old male who presents to clinic today for the following health issues:    HPI   Hematoma      Duration: 2 weeks ago     Description (location/character/radiation): pt was hit with a softball in right leg. Pt has a sore and a hematoma    Intensity:  moderate    Accompanying signs and symptoms: none    History (similar episodes/previous evaluation): None    Precipitating or alleviating factors: None    Therapies tried and outcome: None     - Patient was pitching and struck by line drive in Rt shin 2 weeks ago.  - Developed swelling over area with bruising extending down in to foot.  - Bruising resolving.  - Hematoma did open up day after injury, has since scabbed over. Keeping covered with OTC Neosporin.    Patient Active Problem List   Diagnosis     Chronic Back Pain     Testicular hypofunction     GERD (gastroesophageal reflux disease)     HYPERLIPIDEMIA LDL GOAL <130     Anxiety     Benign hypertension     Backache     Major depressive disorder with single episode, in full remission (H)     Impotence of organic origin     Myopia     Panic attacks     Prostatic hypertrophy     Regular astigmatism     Testosterone deficiency     Essential hypertension     Morbid obesity (H)     H/O syncope     Past Surgical History:   Procedure Laterality Date     C REDUCE TESTIS TORSION  1978     HEART CATH LEFT HEART CATH  12/2010    [facility] - normal coronary arteries      IMPLANT IMPLANTABLE CARDIOVERTER DEFIBRILLATOR  12/2010       Social History     Tobacco Use     Smoking status: Never Smoker     Smokeless tobacco: Never Used   Substance Use Topics     Alcohol use: No     Alcohol/week: 0.0 standard drinks     Family History   Problem Relation Age of Onset     Thyroid Disease Mother      Cancer - colorectal Father      Cancer - colorectal Paternal Grandmother      Cancer - colorectal Paternal Grandfather      Cancer - colorectal Paternal Aunt      Cancer - colorectal  "Paternal Uncle      Diabetes No family hx of      Coronary Artery Disease No family hx of      Hypertension No family hx of      Hyperlipidemia No family hx of      Cerebrovascular Disease No family hx of      Breast Cancer No family hx of      Colon Cancer No family hx of      Prostate Cancer No family hx of      Other Cancer No family hx of      Depression No family hx of      Anxiety Disorder No family hx of      Mental Illness No family hx of      Substance Abuse No family hx of      Anesthesia Reaction No family hx of      Asthma No family hx of      Osteoporosis No family hx of      Genetic Disorder No family hx of      Obesity No family hx of      Unknown/Adopted No family hx of          Current Outpatient Medications   Medication Sig Dispense Refill     amLODIPine (NORVASC) 10 MG tablet Take 1 tablet (10 mg) by mouth daily 90 tablet 3     B-D LUER-TONI SYRINGE 20G X 1\" 3 ML MISC        Blood Pressure Monitoring (ADULT BLOOD PRESSURE CUFF LG) KIT One unit 1 kit 0     carbidopa-levodopa (SINEMET)  MG tablet Take 1 tablet by mouth At Bedtime 30 tablet 11     cyclobenzaprine (FLEXERIL) 10 MG tablet TAKE 1/2 TO 1 TABLET BY MOUTH THREE TIMES A DAY AS NEEDED FOR MUSCLE SPASMS 30 tablet 1     gabapentin (NEURONTIN) 300 MG capsule TAKE 1 CAPSULE BY MOUTH twice daily and take 2 at bedtime 120 capsule 3     ibuprofen (ADVIL/MOTRIN) 800 MG tablet TAKE 1 TABLET BY MOUTH EVERY 8 HOURS AS NEEDED FOR PAIN 90 tablet 9     lisinopril (PRINIVIL/ZESTRIL) 20 MG tablet TAKE 1 TABLET BY MOUTH DAILY 30 tablet 3     methylPREDNISolone (MEDROL DOSEPAK) 4 MG tablet therapy pack Follow Package Directions 21 tablet 0     metoprolol succinate ER (TOPROL-XL) 100 MG 24 hr tablet Take 1 tablet (100 mg) by mouth daily 30 tablet 1     omeprazole (PRILOSEC) 40 MG capsule TAKE 1 CAPSULE BY MOUTH EVERY DAY 30 capsule 5     order for DME 1 proten shake a day       ranitidine (ZANTAC) 150 MG tablet TAKE 1 TABLET BY MOUTH TWO TIMES A  " "tablet 3     SUBOXONE 8-2 MG film Place 1 Film under the tongue 3 times daily   0     Syringe/Needle, Disp, (SYRINGE LUER LOCK) 23G X 1\" 3 ML MISC 1 Syringe every 21 days 12 each 6     Syringe/Needle, Disp, 20G X 1\" 1 ML MISC Inject 1 mL into the muscle every 21 days 12 each 11     testosterone cypionate (DEPOTESTOSTERONE) 200 MG/ML injection INJECT 1 ML INTO THE MUSCLE EVERY 21 DAYS 4 mL 2       Reviewed and updated as needed this visit by Provider         Review of Systems   ROS COMP: Constitutional, HEENT, cardiovascular, pulmonary, GI, , musculoskeletal, neuro, skin, endocrine and psych systems are negative, except as otherwise noted.      Objective    /88   Pulse 78   Resp 16   Ht 1.829 m (6')   Wt 123.8 kg (273 lb)   SpO2 97%   BMI 37.03 kg/m    Body mass index is 37.03 kg/m .  Physical Exam   GENERAL:  WDWN, no acute distress  PSYCH: pleasant, cooperative  EYES: no discharge, no injection  HENT:  Normocephalic. Moist mucus membranes.  NECK:  Supple, symmetric  SKIN:  Warm and dry. Large hematoma over proximal Rt shin with irregular shaped scab; surrounded pink-violet patch with trace pitting edema throughout    NEUROLOGIC: alert, sensation grossly intact.    Diagnostic Test Results:  Xray - nothing acute by my read, awaiting radiology report        Assessment & Plan       ICD-10-CM    1. Contusion of right lower extremity, initial encounter S80.11XA XR Tibia & Fibula Right 2 Views   2. Struck by softball, initial encounter W21.07XA    3. Need for vaccination Z23 TDAP VACCINE     - Patient with extensive hematoma on Rt shin secondary to high velocity collision from softball  - Discussed may take up to 3 months to completely resolve  - Ice prn swelling and pain  - Reviewed signs of infection and when to return for recheck.    No follow-ups on file.    Dinorah Lehman PA-C  [facility]      "

## 2019-10-30 ENCOUNTER — TELEPHONE (OUTPATIENT)
Dept: FAMILY MEDICINE | Facility: CLINIC | Age: 54
End: 2019-10-30

## 2019-10-30 NOTE — LETTER
October 30, 2019      Dion Yepez  [address]  [place] [facility] [address]      Dear Dion,       We care about your health and have reviewed your health plan including your medical conditions, medications, and lab results.  Based on this review, it is recommended that you follow up regarding the following health topic(s):  -High Blood Pressure  -Colon Cancer Screening  -Wellness (Physical) Visit     We recommend you take the following action(s):  -schedule a WELLNESS (Physical) APPOINTMENT.  We will perform the following labs: A1c, Lipids (fasting cholesterol - nothing to eat except water and/or meds for 8-10 hours), CMP(complete metabolic panel), Microablumin and TSH (thyroid test).  -schedule a COLONOSCOPY to look for colon cancer (due every 10 years or 5 years in higher risk situations.)  Colonoscopies can prevent 90-95% of colon cancer deaths.  Problem lesions can be removed before they ever become cancer.  If you do not wish to do a colonoscopy or cannot afford to do one at this time, there is another option called a Fecal Immunochemical Occult Blood Test (FIT) a take home stool sample kit.  It does not replace the colonoscopy for colorectal cancer screening, but it can detect hidden bleeding in the lower colon.  It does need to be repeated every year and if a positive result is obtained, you would be referred for a colonoscopy.  If you have completed either one of these tests at another facility, please have the records sent to our clinic for our records.     Please call us at the [facility]- [phone] (or use [employer]) to address the above recommendations.     Healthy Regards,    Your Health Care Team    [facility]

## 2019-10-30 NOTE — TELEPHONE ENCOUNTER
Panel Management Review      Patient has the following on his problem list:     Depression / Dysthymia review    Measure:  Needs PHQ-9 score of 4 or less during index window.  Administer PHQ-9 and if score is 5 or more, send encounter to provider for next steps.    5 - 7 month window range:     PHQ-9 SCORE 7/10/2012 8/21/2018 5/16/2019   PHQ-9 Total Score 18 - -   PHQ-9 Total Score MyChart - 0 -   PHQ-9 Total Score - 0 7       If PHQ-9 recheck is 5 or more, route to provider for next steps.    Patient is due for:  PHQ9    Hypertension   Last three blood pressure readings:  BP Readings from Last 3 Encounters:   10/14/19 138/88   06/13/19 (!) 164/92   05/16/19 144/84     Blood pressure: MONITOR    HTN Guidelines:  Less than 140/90      Composite cancer screening  Chart review shows that this patient is due/due soon for the following Colonoscopy  Summary:    Patient is due/failing the following:   BP CHECK, COLONOSCOPY, FOLLOW UP, PHQ9 and PHYSICAL    Action needed:   Patient needs office visit for yearly physical, med check and colonoscopy.    Type of outreach:    Sent letter.    Questions for provider review:    None                                                                                                                                    Princess FRANCISCO Caicedo CMA       Chart routed to NA .

## 2019-11-11 DIAGNOSIS — I10 ESSENTIAL HYPERTENSION: ICD-10-CM

## 2019-11-12 ENCOUNTER — TELEPHONE (OUTPATIENT)
Dept: FAMILY MEDICINE | Facility: CLINIC | Age: 54
End: 2019-11-12

## 2019-11-12 DIAGNOSIS — I10 ESSENTIAL HYPERTENSION: ICD-10-CM

## 2019-11-12 NOTE — TELEPHONE ENCOUNTER
"Requested Prescriptions   Pending Prescriptions Disp Refills     metoprolol succinate ER (TOPROL-XL) 100 MG 24 hr tablet [Pharmacy Med Name: METOPROLOL ER SUCCINATE 100MG TABS]  Last Written Prescription Date:  9/12/2019  Last Fill Quantity: 30 tablet,  # refills: 1   Last office visit: 10/14/2019 with prescribing provider:  Fallon   Future Office Visit:     30 tablet 0     Sig: TAKE 1 TABLET BY MOUTH DAILY       Beta-Blockers Protocol Passed - 11/11/2019  4:55 PM        Passed - Blood pressure under 140/90 in past 12 months     BP Readings from Last 3 Encounters:   10/14/19 138/88   06/13/19 (!) 164/92   05/16/19 144/84                 Passed - Patient is age 6 or older        Passed - Recent (12 mo) or future (30 days) visit within the authorizing provider's specialty     Patient has had an office visit with the authorizing provider or a provider within the authorizing providers department within the previous 12 mos or has a future within next 30 days. See \"Patient Info\" tab in inbasket, or \"Choose Columns\" in Meds & Orders section of the refill encounter.              Passed - Medication is active on med list           "

## 2019-11-12 NOTE — TELEPHONE ENCOUNTER
Reason for Call:  Other call back    Detailed comments: The patient called and stated that he is getting very frustrated with having to call every month to get his blood pressure medication refilled. He was told he could sign up for automatic refills through his pharmacy and he stated that he had already tried that. He is wondering if there is any other service that he could sign up for that would make refilling prescription easier. He would like a call back to discuss this.     Phone Number Patient can be reached at: Cell number on file:    Telephone Information:   Mobile [phone]       Best Time: Any    Can we leave a detailed message on this number? YES    Call taken on 11/12/2019 at 1:53 PM by Cassandra Pryor

## 2019-11-13 RX ORDER — METOPROLOL SUCCINATE 100 MG/1
100 TABLET, EXTENDED RELEASE ORAL DAILY
Qty: 90 TABLET | Refills: 2 | Status: SHIPPED | OUTPATIENT
Start: 2019-11-13 | End: 2020-06-29

## 2019-11-13 RX ORDER — METOPROLOL SUCCINATE 100 MG/1
TABLET, EXTENDED RELEASE ORAL
Qty: 30 TABLET | Refills: 0 | OUTPATIENT
Start: 2019-11-13

## 2019-11-13 NOTE — TELEPHONE ENCOUNTER
Talked with patient.     Order was updated from 30 days to 90 as requested.     No further follow up needed.

## 2019-12-02 DIAGNOSIS — E29.1 TESTICULAR HYPOFUNCTION: ICD-10-CM

## 2019-12-02 NOTE — TELEPHONE ENCOUNTER
"Syringe/Needle, Disp, (SYRINGE LUER LOCK) 23G X 1\" 3 ML MISC    Last Written Prescription Date:  1/22/2018  Last Fill Quantity: 12 each,  # refills: 6   Last office visit: 10/14/2019 with prescribing provider:  Chromy   Future Office Visit:   Next 5 appointments (look out 90 days)    Dec 04, 2019  2:45 PM CST  PHYSICAL with [facility]illo [facility]  [facility] [address] [place] [id number] [phone]           Routing refill request to provider for review/approval because:  Drug not on the [facility], P or [facility] refill protocol or controlled substance      "

## 2019-12-05 RX ORDER — SYRINGE W-NEEDLE,DISPOSAB,3 ML 23GX1"
1 SYRINGE, EMPTY DISPOSABLE MISCELLANEOUS
Qty: 12 EACH | Refills: 1 | Status: SHIPPED | OUTPATIENT
Start: 2019-12-05 | End: 2021-01-15

## 2019-12-18 ENCOUNTER — TELEPHONE (OUTPATIENT)
Dept: FAMILY MEDICINE | Facility: CLINIC | Age: 54
End: 2019-12-18

## 2019-12-18 DIAGNOSIS — K21.9 GASTROESOPHAGEAL REFLUX DISEASE WITHOUT ESOPHAGITIS: Primary | ICD-10-CM

## 2019-12-18 RX ORDER — FAMOTIDINE 20 MG/1
20 TABLET, FILM COATED ORAL 2 TIMES DAILY
Qty: 180 TABLET | Refills: 0 | Status: SHIPPED | OUTPATIENT
Start: 2019-12-18 | End: 2020-05-27

## 2019-12-18 NOTE — TELEPHONE ENCOUNTER
Routing to provider- Please see prior message from pharmacy.    Need formulary other than ranitidine as it on recall    Please let Nurse Triage know if we should do anything for follow up. Thank you!

## 2019-12-18 NOTE — TELEPHONE ENCOUNTER
Disp Refills Start End ENMANUEL   ranitidine (ZANTAC) 150 MG tablet 180 tablet 3 6/3/2019  No   Sig: TAKE 1 TABLET BY MOUTH TWO TIMES A DAY   Sent to pharmacy as: ranitidine (ZANTAC) 150 MG tablet   Class: E-Prescribe   Order: [id number]   E-Prescribing Status: Receipt confirmed by pharmacy (6/3/2019  8:53 AM CDT)       Message from [facility]:    We are currently unable to order ranitidine from our drug supplier. Please consider sending a new prescription for our formulary alternative FAMOTIDINE. Thank you.

## 2020-04-25 ENCOUNTER — TELEPHONE (OUTPATIENT)
Dept: FAMILY MEDICINE | Facility: CLINIC | Age: 55
End: 2020-04-25

## 2020-04-25 DIAGNOSIS — E29.1 TESTICULAR HYPOFUNCTION: ICD-10-CM

## 2020-04-27 RX ORDER — TESTOSTERONE CYPIONATE 200 MG/ML
INJECTION, SOLUTION INTRAMUSCULAR
Qty: 1 ML | Refills: 0 | Status: SHIPPED | OUTPATIENT
Start: 2020-04-27 | End: 2020-06-10

## 2020-04-27 NOTE — TELEPHONE ENCOUNTER
Routing refill request to provider for review/approval because:  Labs not current:  ALT, hematocrit, Serurm Testosterone, Serum PSA, AST  Patient needs to be seen because:  Due for 6 mo follow-up  BP Out of range

## 2020-04-29 DIAGNOSIS — M54.50 CHRONIC MIDLINE LOW BACK PAIN WITHOUT SCIATICA: ICD-10-CM

## 2020-04-29 DIAGNOSIS — G89.29 CHRONIC MIDLINE LOW BACK PAIN WITHOUT SCIATICA: ICD-10-CM

## 2020-04-29 RX ORDER — GABAPENTIN 300 MG/1
CAPSULE ORAL
Qty: 120 CAPSULE | Refills: 1 | Status: SHIPPED | OUTPATIENT
Start: 2020-04-29 | End: 2020-06-29

## 2020-04-29 NOTE — TELEPHONE ENCOUNTER
Controlled Substance Refill Request for gabapentin  Problem List Complete:  No     PROVIDER TO CONSIDER COMPLETION OF PROBLEM LIST AND OVERVIEW/CONTROLLED SUBSTANCE AGREEMENT    Last Written Prescription Date:  2/27/2020   Last Fill Quantity: 120 ,   # refills: 1    THE MOST RECENT OFFICE VISIT MUST BE WITHIN THE PAST 3 MONTHS. AT LEAST ONE FACE TO FACE VISIT MUST OCCUR EVERY 6 MONTHS. ADDITIONAL VISITS CAN BE VIRTUAL.  (THIS STATEMENT SHOULD BE DELETED.)    Last Office Visit with [facility] primary care provider: 10/14/2019    Future Office visit:   Next 5 appointments (look out 90 days)    May 08, 2020  3:00 PM CDT  Telephone Visit with [facility]illo [facility] [address] [place] [address] [id number] [phone]          Controlled substance agreement:   Encounter-Level CSA:    There are no encounter-level csa.     Patient-Level CSA:    There are no patient-level csa.         Last Urine Drug Screen: No results found for: CDAUT, No results found for: COMDAT, No results found for: THC13, PCP13, COC13, MAMP13, OPI13, AMP13, BZO13, TCA13, MTD13, BAR13, OXY13, PPX13, BUP13     Processing:  Rx to be electronically transmitted to pharmacy by provider      [email or web address]    Chronic Back Pain   9/12/2003     Overview    No CSA on file     Last  check on 11/20/2019, provider to review, patient getting Suboxone from Dr. Marley Collins             checked in past 3 months?  Yes 4/29/2020, non [facility] provider listed on

## 2020-05-08 ENCOUNTER — VIRTUAL VISIT (OUTPATIENT)
Dept: FAMILY MEDICINE | Facility: CLINIC | Age: 55
End: 2020-05-08
Payer: COMMERCIAL

## 2020-05-08 DIAGNOSIS — E29.1 TESTICULAR HYPOFUNCTION: ICD-10-CM

## 2020-05-08 DIAGNOSIS — E34.9 TESTOSTERONE DEFICIENCY: Primary | ICD-10-CM

## 2020-05-08 PROCEDURE — 99213 OFFICE O/P EST LOW 20 MIN: CPT | Mod: TEL | Performed by: FAMILY MEDICINE

## 2020-05-08 NOTE — PROGRESS NOTES
"Dion Yepez is a 54 year old male who is being evaluated via a billable telephone visit.      The patient has been notified of following:     \"This telephone visit will be conducted via a call between you and your physician/provider. We have found that certain health care needs can be provided without the need for a physical exam.  This service lets us provide the care you need with a short phone conversation.  If a prescription is necessary we can send it directly to your pharmacy.  If lab work is needed we can place an order for that and you can then stop by our lab to have the test done at a later time.    Telephone visits are billed at different rates depending on your insurance coverage. During this emergency period, for some insurers they may be billed the same as an in-person visit.  Please reach out to your insurance provider with any questions.    If during the course of the call the physician/provider feels a telephone visit is not appropriate, you will not be charged for this service.\"    Patient has given verbal consent for Telephone visit?  Yes    What phone number would you like to be contacted at? [phone]    How would you like to obtain your AVS? Mail a copy    Subjective     Dion Yepez is a 54 year old male who presents to clinic today for the following health issues:    HPI  Lab Orders      Duration: today    Description (location/character/radiation): needs order for testosterone level     Intensity:  moderate    Accompanying signs and symptoms: na    History (similar episodes/previous evaluation): None    Precipitating or alleviating factors: None    Therapies tried and outcome: none     Pt has been receiving testosterone injections every 3 weeks for past 2-3 years.  His last shot was end of last week    He has not had lab drawn to check on the testosterone level for quite a while.  Chart review shows that last test was in 2/2016         Allergies   Allergen Reactions     " Methadone      Prolonged qt interval, temporary pacemaker       Reviewed and updated as needed this visit by Provider         Review of Systems   ROS COMP: CONSTITUTIONAL: NEGATIVE for fever, chills, change in weight  ENT/MOUTH: NEGATIVE for ear, mouth and throat problems  RESP: NEGATIVE for significant cough or SOB  CV: NEGATIVE for chest pain, palpitations or peripheral edema       Objective   Reported vitals:  There were no vitals taken for this visit.   healthy, alert and no distress  PSYCH: Alert and oriented times 3; coherent speech, normal   rate and volume, able to articulate logical thoughts, able   to abstract reason, no tangential thoughts, no hallucinations   or delusions  His affect is normal and pleasant  RESP: No cough, no audible wheezing, able to talk in full sentences  Remainder of exam unable to be completed due to telephone visits    Diagnostic Test Results:  Labs reviewed in [facility]  Future lab orders placed        Assessment/Plan:  1. Testosterone deficiency  stable  - Testosterone total; Future    2. Testicular hypofunction  Stable   - Testosterone total; Future    Return in about 3 months (around 8/8/2020) for hypotestosterone.      Phone call duration:  8 minutes    Rainer Castillo MD

## 2020-06-10 DIAGNOSIS — E29.1 TESTICULAR HYPOFUNCTION: ICD-10-CM

## 2020-06-10 RX ORDER — TESTOSTERONE CYPIONATE 200 MG/ML
INJECTION, SOLUTION INTRAMUSCULAR
Qty: 1 ML | Refills: 5 | Status: SHIPPED | OUTPATIENT
Start: 2020-06-10 | End: 2020-09-29

## 2020-06-29 DIAGNOSIS — M54.50 CHRONIC MIDLINE LOW BACK PAIN WITHOUT SCIATICA: ICD-10-CM

## 2020-06-29 DIAGNOSIS — I10 ESSENTIAL HYPERTENSION: ICD-10-CM

## 2020-06-29 DIAGNOSIS — G89.29 CHRONIC MIDLINE LOW BACK PAIN WITHOUT SCIATICA: ICD-10-CM

## 2020-06-29 RX ORDER — IBUPROFEN 800 MG/1
TABLET, FILM COATED ORAL
Qty: 90 TABLET | Refills: 9 | Status: SHIPPED | OUTPATIENT
Start: 2020-06-29 | End: 2021-04-16

## 2020-06-29 RX ORDER — METOPROLOL SUCCINATE 100 MG/1
TABLET, EXTENDED RELEASE ORAL
Qty: 90 TABLET | Refills: 2 | Status: SHIPPED | OUTPATIENT
Start: 2020-06-29 | End: 2021-04-06

## 2020-06-29 RX ORDER — GABAPENTIN 300 MG/1
CAPSULE ORAL
Qty: 120 CAPSULE | Refills: 4 | Status: SHIPPED | OUTPATIENT
Start: 2020-06-29 | End: 2020-12-30

## 2020-06-29 NOTE — TELEPHONE ENCOUNTER
Routing refill request to provider for review/approval because:  Labs not current:  ALT, AST, CBC, Creat

## 2020-06-29 NOTE — TELEPHONE ENCOUNTER
Routing refill request to provider for review/approval because:  Drug not on the [facility] refill protocol     Last  check on 06/29/20, provider to review, patient getting Suboxone from Dr. Marley Collins

## 2020-06-29 NOTE — TELEPHONE ENCOUNTER
Controlled Substance Refill Request for gabapentin (NEURONTIN) 300 MG capsule     Problem List Complete:  Yes    Backache   2/16/2018         checked in past 3 months?  No, route to RN

## 2020-08-25 DIAGNOSIS — M54.50 CHRONIC MIDLINE LOW BACK PAIN WITHOUT SCIATICA: ICD-10-CM

## 2020-08-25 DIAGNOSIS — I10 ESSENTIAL HYPERTENSION: ICD-10-CM

## 2020-08-25 DIAGNOSIS — G89.29 CHRONIC MIDLINE LOW BACK PAIN WITHOUT SCIATICA: ICD-10-CM

## 2020-08-25 RX ORDER — AMLODIPINE BESYLATE 5 MG/1
TABLET ORAL
Qty: 90 TABLET | Refills: 0 | Status: SHIPPED | OUTPATIENT
Start: 2020-08-25 | End: 2020-11-04

## 2020-10-24 DIAGNOSIS — E29.1 TESTICULAR HYPOFUNCTION: ICD-10-CM

## 2020-10-27 RX ORDER — TESTOSTERONE CYPIONATE 200 MG/ML
INJECTION, SOLUTION INTRAMUSCULAR
Qty: 1 ML | Refills: 0 | Status: SHIPPED | OUTPATIENT
Start: 2020-10-27 | End: 2020-11-23

## 2020-10-27 NOTE — TELEPHONE ENCOUNTER
Routing refill request to provider for review/approval because:  Labs not current:     Lipid panel on file in past 12 mos Protocol Details    ALT on file within past 12 mos     HCT less than 54% on file within past 12 mos     Serum Testosterone on file within past 12 mos     Serum PSA on file within past 12 mos     Refills for this classification require provider review     Blood pressure under 140/90 in past 6 months     AST on file within past 12 mos

## 2020-11-21 DIAGNOSIS — E29.1 TESTICULAR HYPOFUNCTION: ICD-10-CM

## 2020-11-23 RX ORDER — TESTOSTERONE CYPIONATE 200 MG/ML
INJECTION, SOLUTION INTRAMUSCULAR
Qty: 1 ML | Refills: 0 | Status: SHIPPED | OUTPATIENT
Start: 2020-11-23 | End: 2020-12-12

## 2020-11-23 NOTE — TELEPHONE ENCOUNTER
Routing refill request to provider for review/approval because:  Labs not current:  Lipid panel, ALT, AST, hematocrit, Testosterone, PSA  Requires provider review  Due for 6 month follow-up

## 2020-11-27 ENCOUNTER — VIRTUAL VISIT (OUTPATIENT)
Dept: FAMILY MEDICINE | Facility: CLINIC | Age: 55
End: 2020-11-27
Payer: COMMERCIAL

## 2020-11-27 DIAGNOSIS — F32.1 CURRENT MODERATE EPISODE OF MAJOR DEPRESSIVE DISORDER WITHOUT PRIOR EPISODE (H): Primary | ICD-10-CM

## 2020-11-27 DIAGNOSIS — G47.00 INSOMNIA, UNSPECIFIED TYPE: ICD-10-CM

## 2020-11-27 DIAGNOSIS — F41.9 ANXIETY: ICD-10-CM

## 2020-11-27 PROCEDURE — 99214 OFFICE O/P EST MOD 30 MIN: CPT | Mod: TEL | Performed by: FAMILY MEDICINE

## 2020-11-27 RX ORDER — HYDROXYZINE HYDROCHLORIDE 25 MG/1
25 TABLET, FILM COATED ORAL AT BEDTIME
Qty: 30 TABLET | Refills: 0 | Status: SHIPPED | OUTPATIENT
Start: 2020-11-27 | End: 2023-12-15

## 2020-11-27 RX ORDER — TRAZODONE HYDROCHLORIDE 50 MG/1
50 TABLET, FILM COATED ORAL AT BEDTIME
Qty: 30 TABLET | Refills: 0 | Status: SHIPPED | OUTPATIENT
Start: 2020-11-27 | End: 2020-12-28

## 2020-11-27 RX ORDER — BUPROPION HYDROCHLORIDE 150 MG/1
150 TABLET ORAL EVERY MORNING
Qty: 30 TABLET | Refills: 1 | Status: SHIPPED | OUTPATIENT
Start: 2020-11-27

## 2020-11-27 ASSESSMENT — ANXIETY QUESTIONNAIRES
3. WORRYING TOO MUCH ABOUT DIFFERENT THINGS: MORE THAN HALF THE DAYS
1. FEELING NERVOUS, ANXIOUS, OR ON EDGE: MORE THAN HALF THE DAYS
GAD7 TOTAL SCORE: 15
7. FEELING AFRAID AS IF SOMETHING AWFUL MIGHT HAPPEN: NEARLY EVERY DAY
2. NOT BEING ABLE TO STOP OR CONTROL WORRYING: MORE THAN HALF THE DAYS
IF YOU CHECKED OFF ANY PROBLEMS ON THIS QUESTIONNAIRE, HOW DIFFICULT HAVE THESE PROBLEMS MADE IT FOR YOU TO DO YOUR WORK, TAKE CARE OF THINGS AT HOME, OR GET ALONG WITH OTHER PEOPLE: SOMEWHAT DIFFICULT
5. BEING SO RESTLESS THAT IT IS HARD TO SIT STILL: MORE THAN HALF THE DAYS
6. BECOMING EASILY ANNOYED OR IRRITABLE: MORE THAN HALF THE DAYS

## 2020-11-27 ASSESSMENT — PATIENT HEALTH QUESTIONNAIRE - PHQ9
SUM OF ALL RESPONSES TO PHQ QUESTIONS 1-9: 15
5. POOR APPETITE OR OVEREATING: MORE THAN HALF THE DAYS

## 2020-11-27 NOTE — PROGRESS NOTES
"Dion Yepez is a 55 year old male who is being evaluated via a billable telephone visit.      The patient has been notified of following:     \"This telephone visit will be conducted via a call between you and your physician/provider. We have found that certain health care needs can be provided without the need for a physical exam.  This service lets us provide the care you need with a short phone conversation.  If a prescription is necessary we can send it directly to your pharmacy.  If lab work is needed we can place an order for that and you can then stop by our lab to have the test done at a later time.    Telephone visits are billed at different rates depending on your insurance coverage. During this emergency period, for some insurers they may be billed the same as an in-person visit.  Please reach out to your insurance provider with any questions.    If during the course of the call the physician/provider feels a telephone visit is not appropriate, you will not be charged for this service.\"    Patient has given verbal consent for Telephone visit?  Yes    What phone number would you like to be contacted at? [phone]    How would you like to obtain your AVS? Mail a copy    Subjective     Dion Yepez is a 55 year old male who presents via phone visit today for the following health issues:    HPI     Anxiety Follow-Up    How are you doing with your anxiety since your last visit? Worsened     Are you having other symptoms that might be associated with anxiety? Yes:  sleep problems    Have you had a significant life event? No     Are you feeling depressed? Yes:  trouble sleeping    Do you have any concerns with your use of alcohol or other drugs? No    Social History     Tobacco Use     Smoking status: Never Smoker     Smokeless tobacco: Never Used   Substance Use Topics     Alcohol use: No     Alcohol/week: 0.0 standard drinks     Drug use: No     HALINA-7 SCORE 8/21/2018 5/16/2019 11/27/2020 "   Total Score 0 (minimal anxiety) - -   Total Score 0 3 15     PHQ 8/21/2018 5/16/2019 11/27/2020   PHQ-9 Total Score 0 7 15   Q9: Thoughts of better off dead/self-harm past 2 weeks Not at all Not at all Not at all     Last PHQ-9 11/27/2020   1.  Little interest or pleasure in doing things 2   2.  Feeling down, depressed, or hopeless 2   3.  Trouble falling or staying asleep, or sleeping too much 3   4.  Feeling tired or having little energy 3   5.  Poor appetite or overeating 3   6.  Feeling bad about yourself 2   7.  Trouble concentrating 0   8.  Moving slowly or restless 0   Q9: Thoughts of better off dead/self-harm past 2 weeks 0   PHQ-9 Total Score 15   Difficulty at work, home, or with people Somewhat difficult     HALINA-7  11/27/2020   1. Feeling nervous, anxious, or on edge 2   2. Not being able to stop or control worrying 2   3. Worrying too much about different things 2   4. Trouble relaxing 2   5. Being so restless that it is hard to sit still 2   6. Becoming easily annoyed or irritable 2   7. Feeling afraid, as if something awful might happen 3   HALINA-7 Total Score 15   If you checked any problems, how difficult have they made it for you to do your work, take care of things at home, or get along with other people? Somewhat difficult         How many servings of fruits and vegetables do you eat daily?  0-1    On average, how many sweetened beverages do you drink each day (Examples: soda, juice, sweet tea, etc.  Do NOT count diet or artificially sweetened beverages)?   1    How many days per week do you exercise enough to make your heart beat faster? 3 or less    How many minutes a day do you exercise enough to make your heart beat faster? 9 or less    How many days per week do you miss taking your medication? 0    patient would like to try Prozac again  Also wants to no if he can increase his dose of gabapentin has gained wait dur to no exercise and meds arent working the same    Pt feels more anxious but  also depressed    His wife's health has been getting worse, that has been increasing his anxiety levels             Review of Systems   CONSTITUTIONAL: NEGATIVE for fever, chills, change in weight  ENT/MOUTH: NEGATIVE for ear, mouth and throat problems  RESP: NEGATIVE for significant cough or SOB  CV: NEGATIVE for chest pain, palpitations or peripheral edema  MUSCULOSKELETAL: POSITIVE  for back pain low back  PSYCHIATRIC: POSITIVE foranxiety, depressed mood, Hx depression and insomnia worsening       Objective          Vitals:  No vitals were obtained today due to virtual visit.    healthy, alert and no distress  PSYCH: Alert and oriented times 3; coherent speech, normal   rate and volume, able to articulate logical thoughts, able   to abstract reason, no tangential thoughts, no hallucinations   or delusions  His affect is anxious  RESP: No cough, no audible wheezing, able to talk in full sentences  Remainder of exam unable to be completed due to telephone visits    No diagnostic tests today        Assessment/Plan:    Assessment & Plan     Dion was seen today for medication request.    Diagnoses and all orders for this visit:    Current moderate episode of major depressive disorder without prior episode (H)  -     buPROPion (WELLBUTRIN XL) 150 MG 24 hr tablet; Take 1 tablet (150 mg) by mouth every morning    Anxiety  -     hydrOXYzine (ATARAX) 25 MG tablet; Take 1 tablet (25 mg) by mouth At Bedtime    Insomnia, unspecified type  -     traZODone (DESYREL) 50 MG tablet; Take 1 tablet (50 mg) by mouth At Bedtime  -     hydrOXYzine (ATARAX) 25 MG tablet; Take 1 tablet (25 mg) by mouth At Bedtime            FUTURE APPOINTMENTS:       - Follow-up visit in 1 mo   Patient Instructions   Keep caffeine limited after early evening      Return in about 1 month (around 12/27/2020) for Depression, Anxiety.    Rainer Castillo MD  [facility]    Phone call duration:  18 minutes

## 2020-11-28 ASSESSMENT — ANXIETY QUESTIONNAIRES: GAD7 TOTAL SCORE: 15

## 2020-12-08 DIAGNOSIS — M54.50 CHRONIC MIDLINE LOW BACK PAIN WITHOUT SCIATICA: ICD-10-CM

## 2020-12-08 DIAGNOSIS — G89.29 CHRONIC MIDLINE LOW BACK PAIN WITHOUT SCIATICA: ICD-10-CM

## 2020-12-08 RX ORDER — GABAPENTIN 300 MG/1
CAPSULE ORAL
Qty: 120 CAPSULE | Refills: 4 | OUTPATIENT
Start: 2020-12-08

## 2020-12-11 DIAGNOSIS — E78.5 HYPERLIPIDEMIA LDL GOAL <130: Primary | ICD-10-CM

## 2020-12-11 DIAGNOSIS — E29.1 TESTICULAR HYPOFUNCTION: ICD-10-CM

## 2020-12-11 DIAGNOSIS — I10 BENIGN HYPERTENSION: ICD-10-CM

## 2020-12-11 NOTE — TELEPHONE ENCOUNTER
Routing refill request to provider for review/approval because:  Labs not current:  Pt DUE for labs. (pending for signature).        Requested Prescriptions   Pending Prescriptions Disp Refills     testosterone cypionate (DEPOTESTOSTERONE) 200 MG/ML injection [Pharmacy Med Name: Testosterone Cypionate Intramuscular Solution 200 MG/ML] 1 mL 0     Sig: Inject 1 ml (200 mg) every 3 weeks       Androgen Agents Failed - 12/11/2020  2:01 AM        Failed - Lipid panel on file in past 12 mos     Recent Labs   Lab Test 05/16/19  1341 02/24/15  1429 02/24/15  1429   CHOL 173   < > 163   TRIG 69   < > 100   HDL 43   < > 48   *   < > 95   NHDL 130*   < >  --    VLDL  --   --  20   CHOLHDLRATIO  --   --  3.4    < > = values in this interval not displayed.               Failed - ALT on file within past 12 mos     Recent Labs   Lab Test 02/12/16  1304   ALT 58             Failed - HCT less than 54% on file within past 12 mos     Recent Labs   Lab Test 01/23/17  1126   HCT 42.8             Failed - Serum Testosterone on file within past 12 mos     Recent Labs   Lab Test 02/12/16  1304   TESTOSTTOTAL 159*             Failed - Serum PSA on file within past 12 mos     No results found for: PSA          Failed - Refills for this classification require provider review        Failed - Blood pressure under 140/90 in past 6 months     BP Readings from Last 3 Encounters:   10/14/19 138/88   06/13/19 (!) 164/92   05/16/19 144/84                 Failed - AST on file within past 12 mos     Recent Labs   Lab Test 02/12/16  1304   AST 48*             Passed - Patient is of age 12 and older        Passed - Medication is active on med list        Passed - Patient is not pregnant        Passed - No positive pregnancy test on file within past 12 mos        Passed - Recent (6 mo) or future (30 days) visit within the authorizing provider's specialty

## 2020-12-12 RX ORDER — TESTOSTERONE CYPIONATE 200 MG/ML
INJECTION, SOLUTION INTRAMUSCULAR
Qty: 1 ML | Refills: 0 | Status: SHIPPED | OUTPATIENT
Start: 2020-12-12 | End: 2021-01-13

## 2020-12-25 DIAGNOSIS — G47.00 INSOMNIA, UNSPECIFIED TYPE: ICD-10-CM

## 2020-12-28 RX ORDER — TRAZODONE HYDROCHLORIDE 50 MG/1
TABLET, FILM COATED ORAL
Qty: 30 TABLET | Refills: 0 | Status: SHIPPED | OUTPATIENT
Start: 2020-12-28 | End: 2020-12-29

## 2020-12-28 NOTE — TELEPHONE ENCOUNTER
Reason for Call:  Medication or medication refill:    Do you use a [facility]?  Name of the pharmacy and phone number for the current request:  [facility]    Name of the medication requested: Neurontin    Other request: needing refill. States he was told he needs a visit but he states he just had a visit a few weeks ago    Can we leave a detailed message on this number? YES    Phone number patient can be reached at: Home number on file [phone] (home)    Best Time: any    Call taken on 12/28/2020 at 1:22 PM by Didi Bustillo

## 2020-12-29 DIAGNOSIS — G47.00 INSOMNIA, UNSPECIFIED TYPE: ICD-10-CM

## 2020-12-29 RX ORDER — TRAZODONE HYDROCHLORIDE 50 MG/1
TABLET, FILM COATED ORAL
Qty: 30 TABLET | Refills: 0 | Status: SHIPPED | OUTPATIENT
Start: 2020-12-29 | End: 2023-12-15

## 2020-12-30 DIAGNOSIS — M54.50 CHRONIC MIDLINE LOW BACK PAIN WITHOUT SCIATICA: ICD-10-CM

## 2020-12-30 DIAGNOSIS — G89.29 CHRONIC MIDLINE LOW BACK PAIN WITHOUT SCIATICA: ICD-10-CM

## 2020-12-30 RX ORDER — GABAPENTIN 300 MG/1
CAPSULE ORAL
Qty: 120 CAPSULE | Refills: 4 | Status: SHIPPED | OUTPATIENT
Start: 2020-12-30 | End: 2021-06-14

## 2021-01-07 DIAGNOSIS — I10 ESSENTIAL HYPERTENSION: ICD-10-CM

## 2021-01-07 DIAGNOSIS — M54.50 CHRONIC MIDLINE LOW BACK PAIN WITHOUT SCIATICA: ICD-10-CM

## 2021-01-07 DIAGNOSIS — G89.29 CHRONIC MIDLINE LOW BACK PAIN WITHOUT SCIATICA: ICD-10-CM

## 2021-01-08 RX ORDER — AMLODIPINE BESYLATE 5 MG/1
TABLET ORAL
Qty: 30 TABLET | Refills: 0 | Status: SHIPPED | OUTPATIENT
Start: 2021-01-08 | End: 2021-02-04

## 2021-01-08 RX ORDER — CYCLOBENZAPRINE HCL 10 MG
TABLET ORAL
Qty: 30 TABLET | Refills: 1 | Status: SHIPPED | OUTPATIENT
Start: 2021-01-08 | End: 2021-03-15

## 2021-01-13 ENCOUNTER — VIRTUAL VISIT (OUTPATIENT)
Dept: INTERNAL MEDICINE | Facility: CLINIC | Age: 56
End: 2021-01-13
Payer: COMMERCIAL

## 2021-01-13 DIAGNOSIS — E29.1 TESTICULAR HYPOFUNCTION: Primary | ICD-10-CM

## 2021-01-13 DIAGNOSIS — E29.1 TESTICULAR HYPOFUNCTION: ICD-10-CM

## 2021-01-13 DIAGNOSIS — I10 ESSENTIAL HYPERTENSION: ICD-10-CM

## 2021-01-13 DIAGNOSIS — F32.1 CURRENT MODERATE EPISODE OF MAJOR DEPRESSIVE DISORDER WITHOUT PRIOR EPISODE (H): ICD-10-CM

## 2021-01-13 PROCEDURE — 99214 OFFICE O/P EST MOD 30 MIN: CPT | Mod: TEL | Performed by: FAMILY MEDICINE

## 2021-01-13 RX ORDER — TESTOSTERONE CYPIONATE 200 MG/ML
INJECTION, SOLUTION INTRAMUSCULAR
Qty: 1 ML | Refills: 0 | Status: SHIPPED | OUTPATIENT
Start: 2021-01-13 | End: 2021-05-26

## 2021-01-13 ASSESSMENT — ANXIETY QUESTIONNAIRES
2. NOT BEING ABLE TO STOP OR CONTROL WORRYING: MORE THAN HALF THE DAYS
6. BECOMING EASILY ANNOYED OR IRRITABLE: NOT AT ALL
5. BEING SO RESTLESS THAT IT IS HARD TO SIT STILL: MORE THAN HALF THE DAYS
IF YOU CHECKED OFF ANY PROBLEMS ON THIS QUESTIONNAIRE, HOW DIFFICULT HAVE THESE PROBLEMS MADE IT FOR YOU TO DO YOUR WORK, TAKE CARE OF THINGS AT HOME, OR GET ALONG WITH OTHER PEOPLE: SOMEWHAT DIFFICULT
7. FEELING AFRAID AS IF SOMETHING AWFUL MIGHT HAPPEN: MORE THAN HALF THE DAYS
1. FEELING NERVOUS, ANXIOUS, OR ON EDGE: SEVERAL DAYS
GAD7 TOTAL SCORE: 11
3. WORRYING TOO MUCH ABOUT DIFFERENT THINGS: MORE THAN HALF THE DAYS

## 2021-01-13 ASSESSMENT — PATIENT HEALTH QUESTIONNAIRE - PHQ9
SUM OF ALL RESPONSES TO PHQ QUESTIONS 1-9: 13
5. POOR APPETITE OR OVEREATING: MORE THAN HALF THE DAYS

## 2021-01-13 NOTE — PROGRESS NOTES
Dion is a 55 year old who is being evaluated via a billable telephone visit.      What phone number would you like to be contacted at? [phone]  How would you like to obtain your AVS? Mail a copy  Assessment & Plan     Testicular hypofunction  Stable on replacement  - testosterone cypionate (DEPOTESTOSTERONE) 200 MG/ML injection  Dispense: 1 mL; Refill: 0    Essential hypertension  stable    Current moderate episode of major depressive disorder without prior episode (H)  worsened  - FLUoxetine (PROZAC) 20 MG capsule  Dispense: 90 capsule; Refill: 1                           FUTURE APPOINTMENTS:       - Follow-up visit in 3 mo   Patient Instructions   Keep working on diet, working to lose weight      Return in about 3 months (around 4/13/2021) for Depression.    Rainer Castillo MD  [facility]     Dion is a 55 year old who presents to clinic today for the following health issues     HPI     Depression and Anxiety Follow-Up    How are you doing with your depression since your last visit? No change    How are you doing with your anxiety since your last visit?  No change    Are you having other symptoms that might be associated with depression or anxiety? Yes:  waking up in the middle of the night, negative thoughts    Have you had a significant life event? OTHER: Wife ill, in and out of hospital, mother had stroke      Do you have any concerns with your use of alcohol or other drugs? No     Patient would like to discuss going back on Prozac     Social History     Tobacco Use     Smoking status: Never Smoker     Smokeless tobacco: Never Used   Substance Use Topics     Alcohol use: No     Alcohol/week: 0.0 standard drinks     Drug use: No     PHQ 5/16/2019 11/27/2020 1/13/2021   PHQ-9 Total Score 7 15 13   Q9: Thoughts of better off dead/self-harm past 2 weeks Not at all Not at all Not at all     HALINA-7 SCORE 5/16/2019 11/27/2020 1/13/2021   Total Score - - -   Total  Score 3 15 11     Last PHQ-9 1/13/2021   1.  Little interest or pleasure in doing things 2   2.  Feeling down, depressed, or hopeless 2   3.  Trouble falling or staying asleep, or sleeping too much 3   4.  Feeling tired or having little energy 3   5.  Poor appetite or overeating 2   6.  Feeling bad about yourself 0   7.  Trouble concentrating 1   8.  Moving slowly or restless 0   Q9: Thoughts of better off dead/self-harm past 2 weeks 0   PHQ-9 Total Score 13   Difficulty at work, home, or with people Somewhat difficult     HALINA-7  1/13/2021   1. Feeling nervous, anxious, or on edge 1   2. Not being able to stop or control worrying 2   3. Worrying too much about different things 2   4. Trouble relaxing 2   5. Being so restless that it is hard to sit still 2   6. Becoming easily annoyed or irritable 0   7. Feeling afraid, as if something awful might happen 2   HALINA-7 Total Score 11   If you checked any problems, how difficult have they made it for you to do your work, take care of things at home, or get along with other people? Somewhat difficult       Suicide Assessment Five-step Evaluation and Treatment (SAFE-T)      How many servings of fruits and vegetables do you eat daily?  0-1    On average, how many sweetened beverages do you drink each day (Examples: soda, juice, sweet tea, etc.  Do NOT count diet or artificially sweetened beverages)?   1    How many days per week do you exercise enough to make your heart beat faster? 3 or less    How many minutes a day do you exercise enough to make your heart beat faster? 9 or less    How many days per week do you miss taking your medication? 0    Hypotestosteronism      Duration: years    Description (location/character/radiation): low testosterone level    Intensity:  moderate    Accompanying signs and symptoms: erectile dysfunction    History (similar episodes/previous evaluation): ongoing problems    Precipitating or alleviating factors: None    Therapies tried and  outcome: Testosterone injections          Review of Systems   CONSTITUTIONAL: NEGATIVE for fever, chills, change in weight  ENT/MOUTH: NEGATIVE for ear, mouth and throat problems  RESP: NEGATIVE for significant cough or SOB  CV: NEGATIVE for chest pain, palpitations or peripheral edema  : positive for and erectile dysfunction  PSYCHIATRIC: POSITIVE fordepressed mood and Hx depression      Objective           Vitals:  No vitals were obtained today due to virtual visit.    Physical Exam   healthy, alert and no distress  PSYCH: Alert and oriented times 3; coherent speech, normal   rate and volume, able to articulate logical thoughts, able   to abstract reason, no tangential thoughts, no hallucinations   or delusions  His affect is normal and pleasant  RESP: No cough, no audible wheezing, able to talk in full sentences  Remainder of exam unable to be completed due to telephone visits    No diagnostic tests            Phone call duration: 22 minutes

## 2021-01-14 ASSESSMENT — ANXIETY QUESTIONNAIRES: GAD7 TOTAL SCORE: 11

## 2021-01-15 RX ORDER — SYRINGE WITH NEEDLE, 1 ML 25GX5/8"
SYRINGE, EMPTY DISPOSABLE MISCELLANEOUS
Qty: 12 EACH | Refills: 0 | Status: SHIPPED | OUTPATIENT
Start: 2021-01-15 | End: 2023-12-15

## 2021-02-04 DIAGNOSIS — G89.29 CHRONIC MIDLINE LOW BACK PAIN WITHOUT SCIATICA: ICD-10-CM

## 2021-02-04 DIAGNOSIS — I10 ESSENTIAL HYPERTENSION: ICD-10-CM

## 2021-02-04 DIAGNOSIS — M54.50 CHRONIC MIDLINE LOW BACK PAIN WITHOUT SCIATICA: ICD-10-CM

## 2021-02-04 RX ORDER — AMLODIPINE BESYLATE 5 MG/1
TABLET ORAL
Qty: 90 TABLET | Refills: 0 | Status: SHIPPED | OUTPATIENT
Start: 2021-02-04 | End: 2021-05-04

## 2021-02-04 NOTE — TELEPHONE ENCOUNTER
Routing refill request to provider for review/approval because:  Labs not in range: CR  BP Readings from Last 3 Encounters:   10/14/19 138/88   06/13/19 (!) 164/92   05/16/19 144/84

## 2021-03-09 DIAGNOSIS — G89.29 CHRONIC MIDLINE LOW BACK PAIN WITHOUT SCIATICA: ICD-10-CM

## 2021-03-09 DIAGNOSIS — M54.50 CHRONIC MIDLINE LOW BACK PAIN WITHOUT SCIATICA: ICD-10-CM

## 2021-03-09 NOTE — LETTER
[address] [address]  [phone]      3/19/2021       Dion Yepez  [facility]  [facility] [address]        Dear Dion,  You are overdue for an Annual Eaxm before your prescriptions can be approved for refills. Please call to schedule at the number listed above.   Due to you Primary Physician (Dr Rainer Castillo) retiring, you will need to schedule with one of his Fellow Partners either in person, by telephone or by video.       Sincerely,      [facility]   Internal Medicine

## 2021-03-15 RX ORDER — CYCLOBENZAPRINE HCL 10 MG
TABLET ORAL
Qty: 30 TABLET | Refills: 1 | Status: SHIPPED | OUTPATIENT
Start: 2021-03-15 | End: 2021-06-14

## 2021-04-05 DIAGNOSIS — G89.29 CHRONIC MIDLINE LOW BACK PAIN WITHOUT SCIATICA: ICD-10-CM

## 2021-04-05 DIAGNOSIS — M54.50 CHRONIC MIDLINE LOW BACK PAIN WITHOUT SCIATICA: ICD-10-CM

## 2021-04-05 DIAGNOSIS — I10 ESSENTIAL HYPERTENSION: ICD-10-CM

## 2021-04-05 NOTE — LETTER
[address] [address]  [phone]      4/9/2021       Dion Yepez  [facility]  [facility] [address]        Dear Dion,  You are overdue for an Annual Eaxm before your prescriptions can be approved for refills. Please call to schedule at the number listed above.   Due to you Primary Physician (Dr Rainer Castillo) retiring, you will need to schedule with one of his Fellow Partners either in person, by telephone or by video.       Sincerely,      [facility]   Internal Medicine

## 2021-04-06 RX ORDER — METOPROLOL SUCCINATE 100 MG/1
TABLET, EXTENDED RELEASE ORAL
Qty: 30 TABLET | Refills: 0 | Status: SHIPPED | OUTPATIENT
Start: 2021-04-06 | End: 2021-06-09

## 2021-04-06 NOTE — TELEPHONE ENCOUNTER
Routing refill request to provider for review/approval because:  Labs out of range:  AST  Labs not current:  BP, creatinine, ALT, CBC    AST   Date Value Ref Range Status   02/12/2016 48 (H) 0 - 45 U/L Final     Lab Results   Component Value Date    ALT 58 02/12/2016     BP Readings from Last 3 Encounters:   10/14/19 138/88   06/13/19 (!) 164/92   05/16/19 144/84     Tapan ULRICH RN

## 2021-04-16 RX ORDER — IBUPROFEN 800 MG/1
TABLET, FILM COATED ORAL
Qty: 90 TABLET | Refills: 9 | Status: SHIPPED | OUTPATIENT
Start: 2021-04-16 | End: 2022-04-08

## 2021-05-04 DIAGNOSIS — G89.29 CHRONIC MIDLINE LOW BACK PAIN WITHOUT SCIATICA: ICD-10-CM

## 2021-05-04 DIAGNOSIS — M54.50 CHRONIC MIDLINE LOW BACK PAIN WITHOUT SCIATICA: ICD-10-CM

## 2021-05-04 DIAGNOSIS — I10 ESSENTIAL HYPERTENSION: ICD-10-CM

## 2021-05-04 NOTE — TELEPHONE ENCOUNTER
This patient needs to establish care with a new provider.  Please help this patient set up an office visit with a new provider.

## 2021-05-18 NOTE — TELEPHONE ENCOUNTER
Pt is out of BP medications is going to establish care with Dr. Dhaliwal on 5/26/2021. Shelby ceron?

## 2021-05-19 RX ORDER — AMLODIPINE BESYLATE 5 MG/1
TABLET ORAL
Qty: 30 TABLET | Refills: 0 | Status: SHIPPED | OUTPATIENT
Start: 2021-05-19 | End: 2021-06-09

## 2021-05-26 ENCOUNTER — OFFICE VISIT (OUTPATIENT)
Dept: INTERNAL MEDICINE | Facility: CLINIC | Age: 56
End: 2021-05-26
Payer: COMMERCIAL

## 2021-05-26 VITALS
HEART RATE: 87 BPM | BODY MASS INDEX: 38.07 KG/M2 | DIASTOLIC BLOOD PRESSURE: 80 MMHG | RESPIRATION RATE: 16 BRPM | TEMPERATURE: 98 F | WEIGHT: 281.1 LBS | OXYGEN SATURATION: 95 % | SYSTOLIC BLOOD PRESSURE: 135 MMHG | HEIGHT: 72 IN

## 2021-05-26 DIAGNOSIS — E29.1 HYPOGONADISM MALE: ICD-10-CM

## 2021-05-26 DIAGNOSIS — E29.1 TESTICULAR HYPOFUNCTION: ICD-10-CM

## 2021-05-26 DIAGNOSIS — I10 BENIGN HYPERTENSION: Primary | ICD-10-CM

## 2021-05-26 DIAGNOSIS — R53.83 FATIGUE, UNSPECIFIED TYPE: ICD-10-CM

## 2021-05-26 LAB
ERYTHROCYTE [DISTWIDTH] IN BLOOD BY AUTOMATED COUNT: 13.5 % (ref 10–15)
HCT VFR BLD AUTO: 41.8 % (ref 40–53)
HGB BLD-MCNC: 14.2 G/DL (ref 13.3–17.7)
MCH RBC QN AUTO: 27.8 PG (ref 26.5–33)
MCHC RBC AUTO-ENTMCNC: 34 G/DL (ref 31.5–36.5)
MCV RBC AUTO: 82 FL (ref 78–100)
PLATELET # BLD AUTO: 244 10E9/L (ref 150–450)
RBC # BLD AUTO: 5.11 10E12/L (ref 4.4–5.9)
WBC # BLD AUTO: 6.2 10E9/L (ref 4–11)

## 2021-05-26 PROCEDURE — 36415 COLL VENOUS BLD VENIPUNCTURE: CPT | Performed by: INTERNAL MEDICINE

## 2021-05-26 PROCEDURE — 99214 OFFICE O/P EST MOD 30 MIN: CPT | Performed by: INTERNAL MEDICINE

## 2021-05-26 PROCEDURE — 84443 ASSAY THYROID STIM HORMONE: CPT | Performed by: INTERNAL MEDICINE

## 2021-05-26 PROCEDURE — 85027 COMPLETE CBC AUTOMATED: CPT | Performed by: INTERNAL MEDICINE

## 2021-05-26 RX ORDER — TESTOSTERONE CYPIONATE 200 MG/ML
INJECTION, SOLUTION INTRAMUSCULAR
Qty: 1 ML | Refills: 3 | Status: SHIPPED | OUTPATIENT
Start: 2021-05-26 | End: 2021-09-08

## 2021-05-26 ASSESSMENT — MIFFLIN-ST. JEOR: SCORE: 2148.06

## 2021-05-26 NOTE — PROGRESS NOTES
"    Assessment & Plan     Testicular hypofunction    - testosterone cypionate (DEPOTESTOSTERONE) 200 MG/ML injection; Inject 1 ml (200 mg) every 3 weeks  - Prostate spec antigen screen; Future  - Testosterone Free and Total; Future    Benign hypertension    - Lipid panel reflex to direct LDL Fasting; Future  - Comprehensive metabolic panel; Future    Hypogonadism male    - Syringe/Needle, Disp, 20G X 1\" 1 ML MISC; Inject 1 mL into the muscle every 21 days    Fatigue, unspecified type    - TSH with free T4 reflex  - CBC with platelets        \     BMI:   Estimated body mass index is 38.12 kg/m  as calculated from the following:    Height as of this encounter: 1.829 m (6').    Weight as of this encounter: 127.5 kg (281 lb 1.6 oz).   Weight management plan: Discussed healthy diet and exercise guidelines        Return in about 3 months (around 8/26/2021) for Fasting Lab Only Visit.    Alphonso Dhaliwal MD  [facility]        Lizzie Ashley is a 55 year old who presents for the following health issues     HPI     Hypertension Follow-up      Do you check your blood pressure regularly outside of the clinic? No     Are you following a low salt diet? No    Are your blood pressures ever more than 140 on the top number (systolic) OR more   than 90 on the bottom number (diastolic), for example 140/90? No      How many servings of fruits and vegetables do you eat daily?  0-1    On average, how many sweetened beverages do you drink each day (Examples: soda, juice, sweet tea, etc.  Do NOT count diet or artificially sweetened beverages)?   1    How many days per week do you exercise enough to make your heart beat faster? 3 or less    How many minutes a day do you exercise enough to make your heart beat faster? 9 or less    How many days per week do you miss taking your medication? 0  Pt is out of his testosterone and is feeling the effects of it. Pt used to be active and now has no energy. "       Has history of chronic pain, is on Suboxone therapy prescribed by a pain clinic.        Review of Systems   Constitutional, HEENT, cardiovascular, pulmonary, GI, , musculoskeletal, neuro, skin, endocrine and psych systems are negative, except as otherwise noted.      Objective    /80 (BP Location: Left arm, Patient Position: Chair, Cuff Size: Adult Large)   Pulse 87   Temp 98  F (36.7  C) (Temporal)   Resp 16   Ht 1.829 m (6')   Wt 127.5 kg (281 lb 1.6 oz)   SpO2 95%   BMI 38.12 kg/m    Body mass index is 38.12 kg/m .  Physical Exam   GENERAL: healthy, alert and no distress  NECK: no adenopathy, no asymmetry, masses, or scars and thyroid normal to palpation  RESP: lungs clear to auscultation - no rales, rhonchi or wheezes  CV: regular rate and rhythm, normal S1 S2, no S3 or S4, no murmur, click or rub, no peripheral edema and peripheral pulses strong  ABDOMEN: soft, nontender, no hepatosplenomegaly, no masses and bowel sounds normal  MS: no gross musculoskeletal defects noted, no edema

## 2021-05-26 NOTE — LETTER
6/7/2021         Dion Yepez  [address]  [facility] [address]            Dear Deanna Lucho,    I am writing to inform you of the lab tests you had performed recently.      Your lab results are as follows:    Hemoglobin: NORMAL  Thyroid function: NORMAL    Your lab work was all perfectly normal.    Thank you for allowing me to participate in your care.  If you have further questions, please contact us at [phone].      Sincerely,        GOKUL Dhaliwal MD  Dept. of Internal Medicine  [facility]

## 2021-05-27 LAB — TSH SERPL DL<=0.005 MIU/L-ACNC: 2.86 MU/L (ref 0.4–4)

## 2021-06-08 DIAGNOSIS — I10 ESSENTIAL HYPERTENSION: ICD-10-CM

## 2021-06-08 DIAGNOSIS — G89.29 CHRONIC MIDLINE LOW BACK PAIN WITHOUT SCIATICA: ICD-10-CM

## 2021-06-08 DIAGNOSIS — M54.50 CHRONIC MIDLINE LOW BACK PAIN WITHOUT SCIATICA: ICD-10-CM

## 2021-06-08 NOTE — TELEPHONE ENCOUNTER
Amlodipine Besylate   Routing refill request to provider for review/approval because:  Labs not current:  Creatinine      Creatinine   Date Value Ref Range Status   09/20/2017 1.02 0.66 - 1.25 mg/dL Final

## 2021-06-09 DIAGNOSIS — G89.29 CHRONIC MIDLINE LOW BACK PAIN WITHOUT SCIATICA: ICD-10-CM

## 2021-06-09 DIAGNOSIS — M54.50 CHRONIC MIDLINE LOW BACK PAIN WITHOUT SCIATICA: ICD-10-CM

## 2021-06-09 DIAGNOSIS — I10 ESSENTIAL HYPERTENSION: ICD-10-CM

## 2021-06-09 RX ORDER — AMLODIPINE BESYLATE 5 MG/1
TABLET ORAL
Qty: 90 TABLET | Refills: 3 | Status: SHIPPED | OUTPATIENT
Start: 2021-06-09 | End: 2022-06-02

## 2021-06-09 RX ORDER — METOPROLOL SUCCINATE 100 MG/1
TABLET, EXTENDED RELEASE ORAL
Qty: 30 TABLET | Refills: 1 | Status: SHIPPED | OUTPATIENT
Start: 2021-06-09 | End: 2021-08-05

## 2021-06-14 RX ORDER — CYCLOBENZAPRINE HCL 10 MG
TABLET ORAL
Qty: 30 TABLET | Refills: 1 | Status: SHIPPED | OUTPATIENT
Start: 2021-06-14 | End: 2021-08-05

## 2021-06-14 RX ORDER — GABAPENTIN 300 MG/1
CAPSULE ORAL
Qty: 120 CAPSULE | Refills: 4 | Status: SHIPPED | OUTPATIENT
Start: 2021-06-14 | End: 2022-01-06

## 2021-07-09 DIAGNOSIS — F32.1 CURRENT MODERATE EPISODE OF MAJOR DEPRESSIVE DISORDER WITHOUT PRIOR EPISODE (H): ICD-10-CM

## 2021-07-13 NOTE — TELEPHONE ENCOUNTER
Routing refill request to provider for review/approval because:  Labs out of range:    Was seen in April, but last PHQ failed January  PHQ 5/16/2019 11/27/2020 1/13/2021   PHQ-9 Total Score 7 15 13   Q9: Thoughts of better off dead/self-harm past 2 weeks Not at all Not at all Not at all     Please route to team if f/up needed.  Has lab appt 8/26/21 as of now.  Cierra Escobar RN

## 2021-08-05 DIAGNOSIS — G89.29 CHRONIC MIDLINE LOW BACK PAIN WITHOUT SCIATICA: ICD-10-CM

## 2021-08-05 DIAGNOSIS — M54.50 CHRONIC MIDLINE LOW BACK PAIN WITHOUT SCIATICA: ICD-10-CM

## 2021-08-05 DIAGNOSIS — I10 ESSENTIAL HYPERTENSION: ICD-10-CM

## 2021-08-05 RX ORDER — CYCLOBENZAPRINE HCL 10 MG
TABLET ORAL
Qty: 30 TABLET | Refills: 1 | Status: SHIPPED | OUTPATIENT
Start: 2021-08-05 | End: 2021-10-07

## 2021-08-05 RX ORDER — METOPROLOL SUCCINATE 100 MG/1
TABLET, EXTENDED RELEASE ORAL
Qty: 30 TABLET | Refills: 1 | Status: SHIPPED | OUTPATIENT
Start: 2021-08-05 | End: 2021-10-07

## 2021-08-05 NOTE — TELEPHONE ENCOUNTER
Prescription approved per [facility] Refill Protocol.  Zhen Mccormick RN  [facility] Triage Nurse

## 2021-08-05 NOTE — TELEPHONE ENCOUNTER
Routing refill request to provider for review/approval because:  Drug not on the [facility] refill protocol   Routing refill request to provider for review/approval because:  Drug not on the [facility] refill protocol     Pending Prescriptions:                       Disp   Refills    cyclobenzaprine (FLEXERIL) 10 MG tablet [P*30 tab*1        Sig: TAKE  1/2-1 TABLET BY MOUTH THREE TIMES A DAY AS           NEEDED FOR MUSCLE SPASM    Last Written Prescription Date:  6/14/21  Last Fill Quantity: 30,  # refills: 1   Last office visit: 5/26/2021 with prescribing provider:  Wycoff     Future Office Visit:      Zhen Mccormick RN  [facility]       Zhen Mccormick RN  [facility] Triage Nurse

## 2021-09-04 DIAGNOSIS — E29.1 TESTICULAR HYPOFUNCTION: ICD-10-CM

## 2021-09-04 NOTE — LETTER
[address] [address]  [phone]      9/10/2021       Dion Yepez  [address] [place] [id number]        Dear Dion,  You are due for a follow up appointment with Dr Dhaliwal regarding your prescription for testosterone cypionate (DEPOTESTOSTERONE) 200 MG/ML injection     Please call to schedule, [phone].   Your prescriptions has been refilled, There will be no new refills until after your follow up with Dr. Dhaliwal.       Sincerely,      Dontrell Oconnell MD/Giovanna Christian, [facility]  Internal Medicine      Sincerely,      Yinka Dhaliwal MD/[facility], [facility]  Internal Medicine

## 2021-09-07 NOTE — TELEPHONE ENCOUNTER
Routing refill request to provider for review/approval because:  Due for labs-    Also routing to TC to reach out and schedule patient.    Le Hussein RN  [facility]

## 2021-09-08 RX ORDER — TESTOSTERONE CYPIONATE 200 MG/ML
INJECTION, SOLUTION INTRAMUSCULAR
Qty: 1 ML | Refills: 0 | Status: SHIPPED | OUTPATIENT
Start: 2021-09-08 | End: 2021-10-07

## 2021-10-05 DIAGNOSIS — M54.50 CHRONIC MIDLINE LOW BACK PAIN WITHOUT SCIATICA: ICD-10-CM

## 2021-10-05 DIAGNOSIS — I10 ESSENTIAL HYPERTENSION: ICD-10-CM

## 2021-10-05 DIAGNOSIS — G89.29 CHRONIC MIDLINE LOW BACK PAIN WITHOUT SCIATICA: ICD-10-CM

## 2021-10-06 DIAGNOSIS — E29.1 TESTICULAR HYPOFUNCTION: ICD-10-CM

## 2021-10-06 NOTE — TELEPHONE ENCOUNTER
Routing refill request to provider for review/approval because:  Drug not on the [facility] refill protocol   Cierra Escobar RN

## 2021-10-07 RX ORDER — METOPROLOL SUCCINATE 100 MG/1
TABLET, EXTENDED RELEASE ORAL
Qty: 30 TABLET | Refills: 5 | Status: SHIPPED | OUTPATIENT
Start: 2021-10-07 | End: 2022-03-31

## 2021-10-07 RX ORDER — CYCLOBENZAPRINE HCL 10 MG
TABLET ORAL
Qty: 30 TABLET | Refills: 1 | Status: SHIPPED | OUTPATIENT
Start: 2021-10-07 | End: 2021-12-08

## 2021-10-07 RX ORDER — TESTOSTERONE CYPIONATE 200 MG/ML
INJECTION, SOLUTION INTRAMUSCULAR
Qty: 1 ML | Refills: 0 | Status: SHIPPED | OUTPATIENT
Start: 2021-10-07 | End: 2021-11-26

## 2021-10-07 NOTE — TELEPHONE ENCOUNTER
Routing refill request to provider for review/approval because:  Drug not on the [facility] refill protocol     Zhen Mccormick RN  [facility] Triage Nurse

## 2021-11-26 DIAGNOSIS — E29.1 TESTICULAR HYPOFUNCTION: ICD-10-CM

## 2021-11-26 RX ORDER — TESTOSTERONE CYPIONATE 200 MG/ML
INJECTION, SOLUTION INTRAMUSCULAR
Qty: 1 ML | Refills: 0 | Status: SHIPPED | OUTPATIENT
Start: 2021-11-26 | End: 2022-01-25

## 2021-12-06 DIAGNOSIS — G89.29 CHRONIC MIDLINE LOW BACK PAIN WITHOUT SCIATICA: ICD-10-CM

## 2021-12-06 DIAGNOSIS — M54.50 CHRONIC MIDLINE LOW BACK PAIN WITHOUT SCIATICA: ICD-10-CM

## 2021-12-08 RX ORDER — CYCLOBENZAPRINE HCL 10 MG
TABLET ORAL
Qty: 30 TABLET | Refills: 1 | Status: SHIPPED | OUTPATIENT
Start: 2021-12-08 | End: 2022-02-07

## 2022-01-04 DIAGNOSIS — G89.29 CHRONIC MIDLINE LOW BACK PAIN WITHOUT SCIATICA: ICD-10-CM

## 2022-01-04 DIAGNOSIS — M54.50 CHRONIC MIDLINE LOW BACK PAIN WITHOUT SCIATICA: ICD-10-CM

## 2022-01-06 RX ORDER — GABAPENTIN 300 MG/1
CAPSULE ORAL
Qty: 120 CAPSULE | Refills: 4 | Status: SHIPPED | OUTPATIENT
Start: 2022-01-06 | End: 2023-12-15

## 2022-01-06 NOTE — TELEPHONE ENCOUNTER
gabapentin (NEURONTIN) 300 MG capsule 120 capsule 4 6/14/2021  No   Sig: TAKE 1 CAPSULE BY MOUTH TWO TIMES A DAY AND TAKE 2 CAPSULES EVERY NIGHT AT BEDTIME   Sent to pharmacy as: Gabapentin 300 MG Oral Capsule (NEURONTIN)   Class: E-Prescribe   Order: [id number]   E-Prescribing Status: Receipt confirmed by pharmacy (6/14/2021  7:52 AM CDT)     Last OV 5/26/21     Routing refill request to provider for review/approval because:  Drug not on the [facility] refill protocol     Jack Calixto RN  [facility]

## 2022-01-19 DIAGNOSIS — E29.1 TESTICULAR HYPOFUNCTION: ICD-10-CM

## 2022-01-20 NOTE — TELEPHONE ENCOUNTER
Routing refill request to provider for review/approval because:  Androgen Agents Failed 01/19/2022 03:16 PM   Protocol Details  Lipid panel on file in past 12 mos    ALT on file within past 12 mos    Serum Testosterone on file within past 12 mos    Serum PSA on file within past 12 mos    Refills for this classification require provider review    Blood pressure under 140/90 in past 6 months    Recent (6 mo) or future (30 days) visit within the authorizing provider's specialty    AST on file within past 12 mos     Victoria Morrison RN

## 2022-01-25 RX ORDER — TESTOSTERONE CYPIONATE 200 MG/ML
INJECTION, SOLUTION INTRAMUSCULAR
Qty: 1 ML | Refills: 0 | Status: SHIPPED | OUTPATIENT
Start: 2022-01-25 | End: 2022-02-23

## 2022-02-01 DIAGNOSIS — M54.50 CHRONIC MIDLINE LOW BACK PAIN WITHOUT SCIATICA: ICD-10-CM

## 2022-02-01 DIAGNOSIS — G89.29 CHRONIC MIDLINE LOW BACK PAIN WITHOUT SCIATICA: ICD-10-CM

## 2022-02-01 DIAGNOSIS — F32.1 CURRENT MODERATE EPISODE OF MAJOR DEPRESSIVE DISORDER WITHOUT PRIOR EPISODE (H): ICD-10-CM

## 2022-02-01 NOTE — TELEPHONE ENCOUNTER
Routing refill request to provider for review/approval because:  Cyclobenzaprine  Drug not on the [facility] refill protocol     Fluoxetine:   PHQ-9 score less than 5 in past 6 months    Recent (6 mo) or future (30 days) visit within the authorizing provider's specialty     Malina Ramirez RN

## 2022-02-07 RX ORDER — CYCLOBENZAPRINE HCL 10 MG
TABLET ORAL
Qty: 30 TABLET | Refills: 1 | Status: SHIPPED | OUTPATIENT
Start: 2022-02-07 | End: 2022-04-01

## 2022-02-19 DIAGNOSIS — E29.1 TESTICULAR HYPOFUNCTION: ICD-10-CM

## 2022-02-23 RX ORDER — TESTOSTERONE CYPIONATE 200 MG/ML
INJECTION, SOLUTION INTRAMUSCULAR
Qty: 1 ML | Refills: 0 | Status: SHIPPED | OUTPATIENT
Start: 2022-02-23 | End: 2022-04-06

## 2022-03-01 DIAGNOSIS — F32.1 CURRENT MODERATE EPISODE OF MAJOR DEPRESSIVE DISORDER WITHOUT PRIOR EPISODE (H): ICD-10-CM

## 2022-03-10 ENCOUNTER — TELEPHONE (OUTPATIENT)
Dept: INTERNAL MEDICINE | Facility: CLINIC | Age: 57
End: 2022-03-10
Payer: COMMERCIAL

## 2022-03-10 NOTE — LETTER
[address] [address]  [phone]      3/24/2022       March 24, 2022      Dion Yepez  [address]  [facility] [address]      Your healthcare team cares about your health. To provide you with the best care,   we have reviewed your chart and based on our findings, we see that you are due to:     - COLON CANCER SCREENING:  Call or mychart the clinic to schedule your colonoscopy or schedule/ your FIT Test, or Cologuard test  - OTHER FOLLOW UP:  Physical     If you have already completed these items, please contact the clinic via phone or   Mychart so your care team can review and update your records. Thank you for   choosing [facility] for your healthcare needs. For any questions,   concerns, or to schedule an appointment please contact the clinic.       Healthy Regards,      Your [facility] Care Team

## 2022-03-10 NOTE — LETTER
[address] [address]  [phone]  March 10, 2022  Dion Yepez  [address]  [facility] [address]    Dear Dion,    We care about your health and based on a review of your medical records, recommend the the following, to better manage your health:      You are in particular need of attention regarding:  -Colon Cancer Screening  -Wellness (Physical) Visit     I am recommending that you:     -schedule a WELLNESS (Physical) APPOINTMENT with me.   I will check fasting labs the same day - nothing to eat except water and meds for 8-10 hours prior.    -schedule a COLONOSCOPY to look for colon cancer (due every 10 years or 5 years in higher risk situations.)        Colon cancer is now the second leading cause of cancer-related deaths in the [place] for both men and women and there are over 130,000 new cases and 50,000 deaths per year from colon cancer.  Colonoscopies can prevent 90-95% of these deaths.  Problem lesions can be removed before they ever become cancer.  This test is not only looking for cancer, but also getting rid of precancerious lesions.    If you are under/uninsured, we recommend you contact the [employer] program. [employer] is a free colorectal cancer screening program that provides colonoscopies for eligible under/uninsured [place] men and women. If you are interested in receiving a free colonoscopy, please call [employer] at [phone] (mention code [employer]) to see if you re eligible.      If you do not wish to do a colonoscopy or cannot afford to do one, at this time, there is another option. It is called a FIT test or Fecal Immunochemical Occult Blood Test (take home stool sample kit).  It does not replace the colonoscopy for colorectal cancer screening, but it can detect hidden bleeding in the lower colon.  It does need to be repeated every year and if a positive result is obtained, you  would be referred for a colonoscopy.          If you have completed either one of these tests at another facility, please call with the details of when and where the tests were done and if they were normal or not. Or have the records sent to our clinic so that we can best coordinate your care.      Here is a list of Health Maintenance topics that are due now or due soon:  Health Maintenance Due   Topic Date Due     URINE DRUG SCREEN  Never done     ANNUAL REVIEW OF HM ORDERS  Never done     Discuss Advance Care Planning  Never done     Colorectal Cancer Screening  Never done     Zoster (Shingles) Vaccine (1 of 2) Never done     Preventive Care Visit  05/22/2018     Cholesterol Lab  05/16/2020     Depression Assessment  07/13/2021     COVID-19 Vaccine (3 - Booster for Moderna series) 08/07/2021     Flu Vaccine (1) 09/01/2021     Anxiety Assessment  01/13/2022       Please call us at [phone] or [phone] (or use [employer]) to address the above recommendations.     Thank you for trusting [facility].  We appreciate the opportunity to serve you and look forward to supporting your healthcare needs in the future.    If you have (or plan to have) any of these tests done at a facility other than a [facility] or a [facility], please have the results from these tests sent to your primary physician at [facility].    Healthy Regards,    Yinka Dhaliwal MD/Loni Pimentel [facility]

## 2022-03-10 NOTE — TELEPHONE ENCOUNTER
Patient Quality Outreach      Summary:    Patient Active Problem List   Diagnosis     Chronic Back Pain     Testicular hypofunction     GERD (gastroesophageal reflux disease)     HYPERLIPIDEMIA LDL GOAL <130     Anxiety     Benign hypertension     Backache     Current moderate episode of major depressive disorder without prior episode (H)     Impotence of organic origin     Myopia     Panic attacks     Prostatic hypertrophy     Regular astigmatism     Testosterone deficiency     Essential hypertension     Morbid obesity (H)     H/O syncope         Patient is due/failing the following:   Colon Cancer Screening -  Colonoscopy  Physical  - Due after 5/2018    Type of outreach:    Sent letter.    Questions for provider review:    None                                                                                                                                     Loni Pimentel, CMA

## 2022-03-24 NOTE — TELEPHONE ENCOUNTER
Patient Quality Outreach 2nd Attempt      Summary:    Type of outreach:    Sent letter.    Next Steps:  Reach out within 90 days via Letter.    Max number of attempts reached: Yes. Will try again in 90 days if patient still on fail list.    Questions for provider review:    None                                                                                                                    Loni Pimentel, CMA

## 2022-03-31 DIAGNOSIS — M54.50 CHRONIC MIDLINE LOW BACK PAIN WITHOUT SCIATICA: ICD-10-CM

## 2022-03-31 DIAGNOSIS — I10 ESSENTIAL HYPERTENSION: ICD-10-CM

## 2022-03-31 DIAGNOSIS — G89.29 CHRONIC MIDLINE LOW BACK PAIN WITHOUT SCIATICA: ICD-10-CM

## 2022-03-31 RX ORDER — METOPROLOL SUCCINATE 100 MG/1
TABLET, EXTENDED RELEASE ORAL
Qty: 30 TABLET | Refills: 1 | Status: SHIPPED | OUTPATIENT
Start: 2022-03-31 | End: 2022-06-02

## 2022-03-31 NOTE — TELEPHONE ENCOUNTER
Prescription approved per [facility] Refill Protocol.  Malina Ramirez, RN  [facility] Triage Nurse

## 2022-03-31 NOTE — TELEPHONE ENCOUNTER
Routing refill request to provider for review/approval because:  Drug not on the [facility] refill protocol     Malina Ramirez RN

## 2022-04-01 RX ORDER — CYCLOBENZAPRINE HCL 10 MG
TABLET ORAL
Qty: 30 TABLET | Refills: 1 | Status: SHIPPED | OUTPATIENT
Start: 2022-04-01 | End: 2022-06-02

## 2022-04-04 DIAGNOSIS — E29.1 TESTICULAR HYPOFUNCTION: ICD-10-CM

## 2022-04-05 NOTE — TELEPHONE ENCOUNTER
Routing refill request to provider for review/approval because:  Failed protocol due to:    Lipid panel on file in past 12 mos    ALT on file within past 12 mos    Serum Testosterone on file within past 12 mos    Serum PSA on file within past 12 mos    Refills for this classification require provider review    Blood pressure under 140/90 in past 6 months    Recent (6 mo) or future (30 days) visit within the authorizing provider's specialty    AST on file within past 12 mos     Malina Ramirez RN

## 2022-04-06 RX ORDER — TESTOSTERONE CYPIONATE 200 MG/ML
INJECTION, SOLUTION INTRAMUSCULAR
Qty: 1 ML | Refills: 0 | Status: SHIPPED | OUTPATIENT
Start: 2022-04-06 | End: 2022-05-23

## 2022-04-08 DIAGNOSIS — G89.29 CHRONIC MIDLINE LOW BACK PAIN WITHOUT SCIATICA: ICD-10-CM

## 2022-04-08 DIAGNOSIS — M54.50 CHRONIC MIDLINE LOW BACK PAIN WITHOUT SCIATICA: ICD-10-CM

## 2022-04-08 NOTE — TELEPHONE ENCOUNTER
Routing refill request to provider for review/approval because:  Labs out of range:    AST   Date Value Ref Range Status   02/12/2016 48 (H) 0 - 45 U/L Final      Labs not current:    Lab Results   Component Value Date    ALT 58 02/12/2016     Creatinine   Date Value Ref Range Status   09/20/2017 1.02 0.66 - 1.25 mg/dL Final         Victoria Morrison RN

## 2022-04-14 RX ORDER — IBUPROFEN 800 MG/1
TABLET, FILM COATED ORAL
Qty: 90 TABLET | Refills: 1 | Status: SHIPPED | OUTPATIENT
Start: 2022-04-14 | End: 2023-12-15

## 2022-05-20 DIAGNOSIS — E29.1 TESTICULAR HYPOFUNCTION: ICD-10-CM

## 2022-05-23 RX ORDER — TESTOSTERONE CYPIONATE 200 MG/ML
INJECTION, SOLUTION INTRAMUSCULAR
Qty: 1 ML | Refills: 0 | Status: SHIPPED | OUTPATIENT
Start: 2022-05-23 | End: 2023-12-22

## 2022-05-23 NOTE — TELEPHONE ENCOUNTER
Routing refill request to provider for review/approval because:  Labs not current/out of range:    Recent Labs   Lab Test 05/16/19  1341 01/23/17  1126 02/24/15  1429   CHOL 173 173 163   HDL 43 49 48   * 108* 95   TRIG 69 82 100   CHOLHDLRATIO  --   --  3.4      Lab Results   Component Value Date    ALT 58 02/12/2016      Lab Test 02/12/16  1304   TESTOSTTOTAL 159*       No results found for: PSA     AST   Date Value Ref Range Status   02/12/2016 48 (H) 0 - 45 U/L Final       Refills for this classification require providers review     BP out of date   BP Readings from Last 3 Encounters:   05/26/21 135/80   10/14/19 138/88   06/13/19 (!) 164/92       Pt last seen by PCP in 05/2021. Pt due for an appts.

## 2022-05-31 DIAGNOSIS — I10 ESSENTIAL HYPERTENSION: ICD-10-CM

## 2022-05-31 DIAGNOSIS — G89.29 CHRONIC MIDLINE LOW BACK PAIN WITHOUT SCIATICA: ICD-10-CM

## 2022-05-31 DIAGNOSIS — M54.50 CHRONIC MIDLINE LOW BACK PAIN WITHOUT SCIATICA: ICD-10-CM

## 2022-06-02 RX ORDER — AMLODIPINE BESYLATE 5 MG/1
TABLET ORAL
Qty: 90 TABLET | Refills: 0 | Status: SHIPPED | OUTPATIENT
Start: 2022-06-02 | End: 2023-12-22

## 2022-06-02 RX ORDER — CYCLOBENZAPRINE HCL 10 MG
TABLET ORAL
Qty: 30 TABLET | Refills: 1 | Status: SHIPPED | OUTPATIENT
Start: 2022-06-02

## 2022-06-02 RX ORDER — METOPROLOL SUCCINATE 100 MG/1
TABLET, EXTENDED RELEASE ORAL
Qty: 90 TABLET | Refills: 0 | Status: SHIPPED | OUTPATIENT
Start: 2022-06-02 | End: 2024-01-06

## 2022-06-02 NOTE — TELEPHONE ENCOUNTER
Routing refill request to provider for review/approval because:  Drug not on the [facility] refill protocol (Flexeril)    BP Readings from Last 3 Encounters:   05/26/21 135/80   10/14/19 138/88   06/13/19 (!) 164/92     Creatinine   Date Value Ref Range Status   09/20/2017 1.02 0.66 - 1.25 mg/dL Final     Malina Ramirez RN

## 2023-12-15 ENCOUNTER — OFFICE VISIT (OUTPATIENT)
Dept: FAMILY MEDICINE | Facility: CLINIC | Age: 58
End: 2023-12-15
Payer: COMMERCIAL

## 2023-12-15 VITALS
OXYGEN SATURATION: 95 % | RESPIRATION RATE: 18 BRPM | SYSTOLIC BLOOD PRESSURE: 161 MMHG | WEIGHT: 268.2 LBS | BODY MASS INDEX: 37.55 KG/M2 | HEART RATE: 68 BPM | DIASTOLIC BLOOD PRESSURE: 90 MMHG | TEMPERATURE: 97.7 F | HEIGHT: 71 IN

## 2023-12-15 DIAGNOSIS — Z23 ENCOUNTER FOR IMMUNIZATION: ICD-10-CM

## 2023-12-15 DIAGNOSIS — Z12.11 SCREEN FOR COLON CANCER: ICD-10-CM

## 2023-12-15 DIAGNOSIS — Z12.5 SCREENING FOR MALIGNANT NEOPLASM OF PROSTATE: ICD-10-CM

## 2023-12-15 DIAGNOSIS — G89.29 CHRONIC MIDLINE LOW BACK PAIN WITHOUT SCIATICA: ICD-10-CM

## 2023-12-15 DIAGNOSIS — Z00.00 ROUTINE GENERAL MEDICAL EXAMINATION AT A HEALTH CARE FACILITY: Primary | ICD-10-CM

## 2023-12-15 DIAGNOSIS — E34.9 TESTOSTERONE DEFICIENCY: ICD-10-CM

## 2023-12-15 DIAGNOSIS — E78.5 HYPERLIPIDEMIA LDL GOAL <130: ICD-10-CM

## 2023-12-15 DIAGNOSIS — F32.1 CURRENT MODERATE EPISODE OF MAJOR DEPRESSIVE DISORDER WITHOUT PRIOR EPISODE (H): ICD-10-CM

## 2023-12-15 DIAGNOSIS — Z23 NEED FOR PROPHYLACTIC VACCINATION AND INOCULATION AGAINST INFLUENZA: ICD-10-CM

## 2023-12-15 DIAGNOSIS — M54.50 CHRONIC MIDLINE LOW BACK PAIN WITHOUT SCIATICA: ICD-10-CM

## 2023-12-15 DIAGNOSIS — I10 ESSENTIAL HYPERTENSION: ICD-10-CM

## 2023-12-15 LAB
ALBUMIN SERPL BCG-MCNC: 4.3 G/DL (ref 3.5–5.2)
ALP SERPL-CCNC: 101 U/L (ref 40–150)
ALT SERPL W P-5'-P-CCNC: 32 U/L (ref 0–70)
ANION GAP SERPL CALCULATED.3IONS-SCNC: 13 MMOL/L (ref 7–15)
AST SERPL W P-5'-P-CCNC: 32 U/L (ref 0–45)
BILIRUB SERPL-MCNC: 0.4 MG/DL
BUN SERPL-MCNC: 9.6 MG/DL (ref 6–20)
CALCIUM SERPL-MCNC: 9.6 MG/DL (ref 8.6–10)
CHLORIDE SERPL-SCNC: 102 MMOL/L (ref 98–107)
CHOLEST SERPL-MCNC: 149 MG/DL
CREAT SERPL-MCNC: 0.97 MG/DL (ref 0.67–1.17)
CREAT UR-MCNC: 97.7 MG/DL
DEPRECATED HCO3 PLAS-SCNC: 24 MMOL/L (ref 22–29)
EGFRCR SERPLBLD CKD-EPI 2021: 90 ML/MIN/1.73M2
ERYTHROCYTE [DISTWIDTH] IN BLOOD BY AUTOMATED COUNT: 12.3 % (ref 10–15)
FASTING STATUS PATIENT QL REPORTED: NORMAL
GLUCOSE SERPL-MCNC: 90 MG/DL (ref 70–99)
HBA1C MFR BLD: 5.8 % (ref 0–5.6)
HCT VFR BLD AUTO: 42.9 % (ref 40–53)
HDLC SERPL-MCNC: 44 MG/DL
HGB BLD-MCNC: 14.9 G/DL (ref 13.3–17.7)
LDLC SERPL CALC-MCNC: 92 MG/DL
MCH RBC QN AUTO: 28.1 PG (ref 26.5–33)
MCHC RBC AUTO-ENTMCNC: 34.7 G/DL (ref 31.5–36.5)
MCV RBC AUTO: 81 FL (ref 78–100)
MICROALBUMIN UR-MCNC: <12 MG/L
MICROALBUMIN/CREAT UR: NORMAL MG/G{CREAT}
NONHDLC SERPL-MCNC: 105 MG/DL
PLATELET # BLD AUTO: 243 10E3/UL (ref 150–450)
POTASSIUM SERPL-SCNC: 4.5 MMOL/L (ref 3.4–5.3)
PROT SERPL-MCNC: 7.8 G/DL (ref 6.4–8.3)
PSA SERPL DL<=0.01 NG/ML-MCNC: 0.38 NG/ML (ref 0–3.5)
RBC # BLD AUTO: 5.31 10E6/UL (ref 4.4–5.9)
SHBG SERPL-SCNC: 32 NMOL/L (ref 11–80)
SODIUM SERPL-SCNC: 139 MMOL/L (ref 135–145)
TRIGL SERPL-MCNC: 65 MG/DL
WBC # BLD AUTO: 8.1 10E3/UL (ref 4–11)

## 2023-12-15 PROCEDURE — 84403 ASSAY OF TOTAL TESTOSTERONE: CPT | Performed by: FAMILY MEDICINE

## 2023-12-15 PROCEDURE — 90471 IMMUNIZATION ADMIN: CPT | Performed by: FAMILY MEDICINE

## 2023-12-15 PROCEDURE — 84270 ASSAY OF SEX HORMONE GLOBUL: CPT | Performed by: FAMILY MEDICINE

## 2023-12-15 PROCEDURE — 80061 LIPID PANEL: CPT | Performed by: FAMILY MEDICINE

## 2023-12-15 PROCEDURE — 82570 ASSAY OF URINE CREATININE: CPT | Performed by: FAMILY MEDICINE

## 2023-12-15 PROCEDURE — G0103 PSA SCREENING: HCPCS | Performed by: FAMILY MEDICINE

## 2023-12-15 PROCEDURE — 83036 HEMOGLOBIN GLYCOSYLATED A1C: CPT | Performed by: FAMILY MEDICINE

## 2023-12-15 PROCEDURE — 82043 UR ALBUMIN QUANTITATIVE: CPT | Performed by: FAMILY MEDICINE

## 2023-12-15 PROCEDURE — 80053 COMPREHEN METABOLIC PANEL: CPT | Performed by: FAMILY MEDICINE

## 2023-12-15 PROCEDURE — 36415 COLL VENOUS BLD VENIPUNCTURE: CPT | Performed by: FAMILY MEDICINE

## 2023-12-15 PROCEDURE — 90686 IIV4 VACC NO PRSV 0.5 ML IM: CPT | Performed by: FAMILY MEDICINE

## 2023-12-15 PROCEDURE — 90480 ADMN SARSCOV2 VAC 1/ONLY CMP: CPT | Performed by: FAMILY MEDICINE

## 2023-12-15 PROCEDURE — 99396 PREV VISIT EST AGE 40-64: CPT | Mod: 25 | Performed by: FAMILY MEDICINE

## 2023-12-15 PROCEDURE — 85027 COMPLETE CBC AUTOMATED: CPT | Performed by: FAMILY MEDICINE

## 2023-12-15 PROCEDURE — 91320 SARSCV2 VAC 30MCG TRS-SUC IM: CPT | Performed by: FAMILY MEDICINE

## 2023-12-15 PROCEDURE — 99213 OFFICE O/P EST LOW 20 MIN: CPT | Mod: 25 | Performed by: FAMILY MEDICINE

## 2023-12-15 RX ORDER — AMLODIPINE BESYLATE 10 MG/1
10 TABLET ORAL DAILY
Qty: 90 TABLET | Refills: 1 | Status: SHIPPED | OUTPATIENT
Start: 2023-12-15 | End: 2024-01-06

## 2023-12-15 ASSESSMENT — ANXIETY QUESTIONNAIRES
IF YOU CHECKED OFF ANY PROBLEMS ON THIS QUESTIONNAIRE, HOW DIFFICULT HAVE THESE PROBLEMS MADE IT FOR YOU TO DO YOUR WORK, TAKE CARE OF THINGS AT HOME, OR GET ALONG WITH OTHER PEOPLE: NOT DIFFICULT AT ALL
GAD7 TOTAL SCORE: 5
7. FEELING AFRAID AS IF SOMETHING AWFUL MIGHT HAPPEN: NOT AT ALL
2. NOT BEING ABLE TO STOP OR CONTROL WORRYING: SEVERAL DAYS
3. WORRYING TOO MUCH ABOUT DIFFERENT THINGS: SEVERAL DAYS
1. FEELING NERVOUS, ANXIOUS, OR ON EDGE: SEVERAL DAYS
5. BEING SO RESTLESS THAT IT IS HARD TO SIT STILL: SEVERAL DAYS
4. TROUBLE RELAXING: SEVERAL DAYS
GAD7 TOTAL SCORE: 5
6. BECOMING EASILY ANNOYED OR IRRITABLE: NOT AT ALL

## 2023-12-15 ASSESSMENT — PAIN SCALES - GENERAL: PAINLEVEL: NO PAIN (0)

## 2023-12-15 ASSESSMENT — PATIENT HEALTH QUESTIONNAIRE - PHQ9
SUM OF ALL RESPONSES TO PHQ QUESTIONS 1-9: 3
SUM OF ALL RESPONSES TO PHQ QUESTIONS 1-9: 3
10. IF YOU CHECKED OFF ANY PROBLEMS, HOW DIFFICULT HAVE THESE PROBLEMS MADE IT FOR YOU TO DO YOUR WORK, TAKE CARE OF THINGS AT HOME, OR GET ALONG WITH OTHER PEOPLE: NOT DIFFICULT AT ALL

## 2023-12-15 NOTE — PROGRESS NOTES
SUBJECTIVE:   Dion is a 58 year old, presenting for the following:  Physical and Establish Care        12/15/2023     1:46 PM   Additional Questions   Roomed by Ceci JALLOH     History of Present Illness       Hypertension: He presents for follow up of hypertension.  He does not check blood pressure  regularly outside of the clinic. Outpatient blood pressures have not been over 140/90. He does not follow a low salt diet.     Reason for visit:  Prescription review    He eats 0-1 servings of fruits and vegetables daily.He consumes 1 sweetened beverage(s) daily.He exercises with enough effort to increase his heart rate 10 to 19 minutes per day.  He exercises with enough effort to increase his heart rate 3 or less days per week. He is missing 7 dose(s) of medications per week.      Today's PHQ-9 Score:       12/15/2023     1:37 PM   PHQ-9 SCORE   PHQ-9 Total Score MyChart 3 (Minimal depression)   PHQ-9 Total Score 3     Have you ever done Advance Care Planning? (For example, a Health Directive, POLST, or a discussion with a medical provider or your loved ones about your wishes): No, advance care planning information given to patient to review.  Advanced care planning was discussed at today's visit.    Social History     Tobacco Use    Smoking status: Never    Smokeless tobacco: Never   Substance Use Topics    Alcohol use: No     Alcohol/week: 0.0 standard drinks of alcohol     Last PSA:   Prostate Specific Antigen Screen   Date Value Ref Range Status   12/15/2023 0.38 0.00 - 3.50 ng/mL Final       Reviewed orders with patient. Reviewed health maintenance and updated orders accordingly - Yes    Reviewed and updated as needed this visit by clinical staff   Tobacco  Allergies  Meds     Fam Hx          Reviewed and updated as needed this visit by Provider   Tobacco       [facility] Hx         Review of Systems  CONSTITUTIONAL: NEGATIVE for fever, chills, change in weight  INTEGUMENTARY/SKIN: NEGATIVE for worrisome rashes,  "moles or lesions  EYES: NEGATIVE for vision changes or irritation  ENT: NEGATIVE for ear, mouth and throat problems  RESP: NEGATIVE for significant cough or SOB  CV: NEGATIVE for chest pain, palpitations or peripheral edema  GI: NEGATIVE for nausea, abdominal pain, heartburn, or change in bowel habits   male: negative for dysuria, hematuria, decreased urinary stream, erectile dysfunction, urethral discharge  MUSCULOSKELETAL: NEGATIVE for significant arthralgias or myalgia  NEURO: NEGATIVE for weakness, dizziness or paresthesias  PSYCHIATRIC: NEGATIVE for changes in mood or affect    OBJECTIVE:   BP (!) 161/90   Pulse 68   Temp 97.7  F (36.5  C) (Temporal)   Resp 18   Ht 1.803 m (5' 11\")   Wt 121.7 kg (268 lb 3.2 oz)   SpO2 95%   BMI 37.41 kg/m      Physical Exam  GENERAL: healthy, alert and no distress  EYES: Eyes grossly normal to inspection, PERRL and conjunctivae and sclerae normal  HENT: ear canals and TM's normal, nose and mouth without ulcers or lesions  NECK: no adenopathy, no asymmetry, masses, or scars and thyroid normal to palpation  RESP: lungs clear to auscultation - no rales, rhonchi or wheezes  CV: regular rate and rhythm, normal S1 S2, no S3 or S4, no murmur, click or rub, no peripheral edema and peripheral pulses strong  ABDOMEN: soft, nontender, no hepatosplenomegaly, no masses and bowel sounds normal  MS: no gross musculoskeletal defects noted, no edema  SKIN: no suspicious lesions or rashes  NEURO: Normal strength and tone, mentation intact and speech normal  PSYCH: mentation appears normal, affect normal/bright    Diagnostic Test Results:  Labs reviewed in Epic    ASSESSMENT/PLAN:       ICD-10-CM    1. Routine general medical examination at a health care facility  Z00.00 Comprehensive metabolic panel     CBC with platelets     Hemoglobin A1c     Albumin Random Urine Quantitative with Creat Ratio     Comprehensive metabolic panel     CBC with platelets     Hemoglobin A1c     Albumin " Random Urine Quantitative with Creat Ratio      2. Screen for colon cancer  Z12.11 Colonoscopy Screening  Referral      3. Hyperlipidemia LDL goal <130  E78.5 Lipid panel reflex to direct LDL Non-fasting     Lipid panel reflex to direct LDL Non-fasting      4. Need for prophylactic vaccination and inoculation against influenza  Z23 INFLUENZA VACCINE >6 MONTHS (AFLURIA/FLUZONE)      5. Encounter for immunization  Z23 COVID-19 12+ (2023-24) (PFIZER)      6. Testosterone deficiency  E34.9 Testosterone Bioavailable     Testosterone Bioavailable     Testosterone Bioavailable      7. Current moderate episode of major depressive disorder without prior episode (H)  F32.1 FLUoxetine (PROZAC) 20 MG capsule      8. Screening for malignant neoplasm of prostate  Z12.5 PSA, screen     PSA, screen      9. Essential hypertension  I10 amLODIPine (NORVASC) 10 MG tablet      10. Chronic midline low back pain without sciatica  M54.50     G89.29           Patient has been advised of split billing requirements and indicates understanding: Yes      COUNSELING:   Reviewed preventive health counseling, as reflected in patient instructions        He reports that he has never smoked. He has never used smokeless tobacco.    Diaz Jaffe MD  [facility]

## 2023-12-18 NOTE — RESULT ENCOUNTER NOTE
Dear Dion,   Your results revealed a mild elevation in your A1C. A1C is a measurement of your average blood glucose over the past 3 months. Your results are consistent with pre-diabetes. I would recommend dietary restriction of high calorie meals and avoiding simple carbohydrates to prevent progression into diabetes.     We will re-check your A1C in 3-6 months.         Best Regards  Diaz Jaffe MD

## 2023-12-21 LAB
TESTOST FREE SERPL-MCNC: 2.1 NG/DL
TESTOST SERPL-MCNC: 112 NG/DL (ref 240–950)

## 2023-12-22 DIAGNOSIS — E29.1 TESTICULAR HYPOFUNCTION: ICD-10-CM

## 2023-12-22 RX ORDER — TESTOSTERONE CYPIONATE 200 MG/ML
INJECTION, SOLUTION INTRAMUSCULAR
Qty: 2 ML | Refills: 0 | Status: SHIPPED | OUTPATIENT
Start: 2023-12-22 | End: 2024-01-22

## 2023-12-22 NOTE — RESULT ENCOUNTER NOTE
Dear Dion,   Your testosterone level is very low. I restarted your old prescription for testosterone injections.    Best Regards  Diaz Jaffe MD

## 2023-12-29 ENCOUNTER — HOSPITAL ENCOUNTER (OUTPATIENT)
Facility: CLINIC | Age: 58
End: 2023-12-29
Attending: INTERNAL MEDICINE | Admitting: INTERNAL MEDICINE
Payer: COMMERCIAL

## 2023-12-29 ENCOUNTER — TELEPHONE (OUTPATIENT)
Dept: GASTROENTEROLOGY | Facility: CLINIC | Age: 58
End: 2023-12-29
Payer: COMMERCIAL

## 2023-12-29 NOTE — TELEPHONE ENCOUNTER
"Endoscopy Scheduling Screen    Have you had a positive Covid test in the last 14 days?  No    Are you active on MyChart?   Yes    What insurance is in the chart?  Other:  [employer]    Ordering/Referring Provider:     CLARE VERGARA      (If ordering provider performs procedure, schedule with ordering provider unless otherwise instructed. )    BMI: Estimated body mass index is 37.41 kg/m  as calculated from the following:    Height as of 12/15/23: 1.803 m (5' 11\").    Weight as of 12/15/23: 121.7 kg (268 lb 3.2 oz).     Sedation Ordered  moderate sedation.   If patient BMI > 50 do not schedule in [facility].    If patient BMI > 45 do not schedule at [facility].    Are you taking methadone or Suboxone?  Yes   Patient must be scheduled with MAC sedation.    Please send In Basket alert to [facility] with MRN.    Are you taking any prescription medications for pain 3 or more times per week?   NO - No RN review required.    Do you have a history of malignant hyperthermia or adverse reaction to anesthesia?  No    (Females) Are you currently pregnant?        Have you been diagnosed or told you have pulmonary hypertension?   No    Do you have an LVAD?  No    Have you been told you have moderate to severe sleep apnea?  No    Have you been told you have COPD, asthma, or any other lung disease?  No    Do you have any heart conditions?  No     Have you ever had an organ transplant?   No    Have you ever had or are you awaiting a heart or lung transplant?   No    Have you had a stroke or transient ischemic attack (TIA aka \"mini stroke\" in the last 6 months?   No    Have you been diagnosed with or been told you have cirrhosis of the liver?   No    Are you currently on dialysis?   No    Do you need assistance transferring?   No    BMI: Estimated body mass index is 37.41 kg/m  as calculated from the following:    Height as of 12/15/23: 1.803 m (5' 11\").    Weight as of 12/15/23: 121.7 kg (268 lb 3.2 oz).     Is patients BMI > 40 and scheduling location " [facility]?  No    Do you take an injectable medication for weight loss or diabetes (excluding insulin)?  No    Do you take the medication Naltrexone?  No    Do you take blood thinners?  No       Prep   Are you currently on dialysis or do you have chronic kidney disease?  No    Do you have a diagnosis of diabetes?  No    Do you have a diagnosis of cystic fibrosis (CF)?  No    On a regular basis do you go 3 -5 days between bowel movements?  No    BMI > 40?  No    Preferred Pharmacy:    [facility] [place], [place] - [address] [facility]  [address] [facility] [address]  Phone: [phone] Fax: [phone]      Final Scheduling Details   Colonoscopy prep sent?  Standard MiraLAX    Procedure scheduled  Colonoscopy    Surgeon:  ROLANDO     Date of procedure:  4/17    Pre-OP / PAC:   Yes - Patient informed of pre-op requirement.    Location  [facility] - Per order.    Sedation   MAC/Deep Sedation - Per exclusion criteria.      Patient Reminders:   You will receive a call from a Nurse to review instructions and health history.  This assessment must be completed prior to your procedure.  Failure to complete the Nurse assessment may result in the procedure being cancelled.      On the day of your procedure, please designate an adult(s) who can drive you home stay with you for the next 24 hours. The medicines used in the exam will make you sleepy. You will not be able to drive.      You cannot take public transportation, ride share services, or non-medical taxi service without a responsible caregiver.  Medical transport services are allowed with the requirement that a responsible caregiver will receive you at your destination.  We require that drivers and caregivers are confirmed prior to your procedure.

## 2024-01-05 ENCOUNTER — TELEPHONE (OUTPATIENT)
Dept: FAMILY MEDICINE | Facility: CLINIC | Age: 59
End: 2024-01-05
Payer: COMMERCIAL

## 2024-01-05 DIAGNOSIS — I10 ESSENTIAL HYPERTENSION: ICD-10-CM

## 2024-01-05 NOTE — TELEPHONE ENCOUNTER
FS,  Patient calling to request blood pressure prescription.  States adjustments were made to blood pressure medication and new prescription was never sent to his pharmacy.  Has now been without blood pressure medication for quite some time.  Not sure if he is supposed to be on amlodipine or lisinopril now?  Please advise.  Pharmacy pended.  Sneha GOODSON RN

## 2024-01-06 RX ORDER — AMLODIPINE BESYLATE 10 MG/1
10 TABLET ORAL DAILY
Qty: 90 TABLET | Refills: 1 | Status: SHIPPED | OUTPATIENT
Start: 2024-01-06 | End: 2024-01-22

## 2024-01-06 RX ORDER — METOPROLOL SUCCINATE 100 MG/1
TABLET, EXTENDED RELEASE ORAL
Qty: 90 TABLET | Refills: 0 | Status: SHIPPED | OUTPATIENT
Start: 2024-01-06 | End: 2024-01-22

## 2024-01-06 NOTE — TELEPHONE ENCOUNTER
Patient was given a prescription for amlodipine 10 mg once daily in December 2023.  He also received a prescription for metoprolol 100 mg in September 2023 by his previous provider.  Advise patient to take both prescriptions and schedule a follow-up visit for blood pressure recheck.    MD Ld

## 2024-01-08 NOTE — TELEPHONE ENCOUNTER
Spoke with patient.  Scheduled patient to see FS Monday 1/22 a 1:00 pm.    Will  prescriptions today.  Ankita Ahuja RN

## 2024-01-22 ENCOUNTER — OFFICE VISIT (OUTPATIENT)
Dept: FAMILY MEDICINE | Facility: CLINIC | Age: 59
End: 2024-01-22
Payer: COMMERCIAL

## 2024-01-22 VITALS
SYSTOLIC BLOOD PRESSURE: 144 MMHG | BODY MASS INDEX: 37.8 KG/M2 | HEART RATE: 50 BPM | DIASTOLIC BLOOD PRESSURE: 81 MMHG | RESPIRATION RATE: 16 BRPM | WEIGHT: 270 LBS | HEIGHT: 71 IN | OXYGEN SATURATION: 95 % | TEMPERATURE: 99.6 F

## 2024-01-22 DIAGNOSIS — G25.81 RESTLESS LEG SYNDROME: ICD-10-CM

## 2024-01-22 DIAGNOSIS — I10 BENIGN HYPERTENSION: Primary | ICD-10-CM

## 2024-01-22 DIAGNOSIS — E29.1 TESTICULAR HYPOFUNCTION: ICD-10-CM

## 2024-01-22 DIAGNOSIS — E34.9 TESTOSTERONE DEFICIENCY: ICD-10-CM

## 2024-01-22 DIAGNOSIS — G47.00 INSOMNIA, UNSPECIFIED TYPE: ICD-10-CM

## 2024-01-22 PROCEDURE — 99214 OFFICE O/P EST MOD 30 MIN: CPT | Performed by: FAMILY MEDICINE

## 2024-01-22 PROCEDURE — 36415 COLL VENOUS BLD VENIPUNCTURE: CPT | Performed by: FAMILY MEDICINE

## 2024-01-22 PROCEDURE — 84270 ASSAY OF SEX HORMONE GLOBUL: CPT | Performed by: FAMILY MEDICINE

## 2024-01-22 PROCEDURE — 84403 ASSAY OF TOTAL TESTOSTERONE: CPT | Performed by: FAMILY MEDICINE

## 2024-01-22 RX ORDER — TESTOSTERONE CYPIONATE 200 MG/ML
INJECTION, SOLUTION INTRAMUSCULAR
Qty: 2 ML | Refills: 3 | Status: SHIPPED | OUTPATIENT
Start: 2024-01-22 | End: 2024-01-24

## 2024-01-22 RX ORDER — AMLODIPINE BESYLATE 10 MG/1
10 TABLET ORAL DAILY
Qty: 90 TABLET | Refills: 1 | Status: SHIPPED | OUTPATIENT
Start: 2024-01-22 | End: 2024-07-25

## 2024-01-22 RX ORDER — HYDROXYZINE HYDROCHLORIDE 25 MG/1
25 TABLET, FILM COATED ORAL 3 TIMES DAILY PRN
Qty: 90 TABLET | Refills: 1 | Status: SHIPPED | OUTPATIENT
Start: 2024-01-22 | End: 2024-05-01

## 2024-01-22 RX ORDER — AMLODIPINE AND BENAZEPRIL HYDROCHLORIDE 10; 20 MG/1; MG/1
1 CAPSULE ORAL DAILY
Status: CANCELLED | OUTPATIENT
Start: 2024-01-22

## 2024-01-22 RX ORDER — METOPROLOL SUCCINATE 100 MG/1
100 TABLET, EXTENDED RELEASE ORAL DAILY
Qty: 90 TABLET | Refills: 0 | Status: SHIPPED | OUTPATIENT
Start: 2024-01-22

## 2024-01-22 RX ORDER — LISINOPRIL 20 MG/1
20 TABLET ORAL DAILY
Qty: 90 TABLET | Refills: 1 | Status: SHIPPED | OUTPATIENT
Start: 2024-01-22 | End: 2024-09-12

## 2024-01-22 ASSESSMENT — ANXIETY QUESTIONNAIRES
8. IF YOU CHECKED OFF ANY PROBLEMS, HOW DIFFICULT HAVE THESE MADE IT FOR YOU TO DO YOUR WORK, TAKE CARE OF THINGS AT HOME, OR GET ALONG WITH OTHER PEOPLE?: SOMEWHAT DIFFICULT
IF YOU CHECKED OFF ANY PROBLEMS ON THIS QUESTIONNAIRE, HOW DIFFICULT HAVE THESE PROBLEMS MADE IT FOR YOU TO DO YOUR WORK, TAKE CARE OF THINGS AT HOME, OR GET ALONG WITH OTHER PEOPLE: SOMEWHAT DIFFICULT
GAD7 TOTAL SCORE: 7
1. FEELING NERVOUS, ANXIOUS, OR ON EDGE: SEVERAL DAYS
4. TROUBLE RELAXING: SEVERAL DAYS
7. FEELING AFRAID AS IF SOMETHING AWFUL MIGHT HAPPEN: SEVERAL DAYS
GAD7 TOTAL SCORE: 7
3. WORRYING TOO MUCH ABOUT DIFFERENT THINGS: SEVERAL DAYS
5. BEING SO RESTLESS THAT IT IS HARD TO SIT STILL: SEVERAL DAYS
2. NOT BEING ABLE TO STOP OR CONTROL WORRYING: SEVERAL DAYS
7. FEELING AFRAID AS IF SOMETHING AWFUL MIGHT HAPPEN: SEVERAL DAYS
6. BECOMING EASILY ANNOYED OR IRRITABLE: SEVERAL DAYS

## 2024-01-22 ASSESSMENT — PAIN SCALES - GENERAL: PAINLEVEL: NO PAIN (0)

## 2024-01-22 NOTE — LETTER
"February 2, 2024      Dion Reedtiny  [address]  [facility] [address]        Dear ,    We are writing to inform you of your test results. We attempted to send you a [employer] message but you have not viewed it yet. Here is a copy of the [employer] message and your lab results.   \"Your circulating testosterone level is very high. I would recommend decreasing the dose to 0.5ml every 2 weeks.   Goal is 400-950 total testosterone.\"    Results for orders placed or performed in visit on 01/22/24   Sex Hormone Binding Globulin     Status: Normal   Result Value Ref Range    Sex Hormone Binding Globulin 35 11 - 80 nmol/L   Testosterone Free and Total     Status: Abnormal   Result Value Ref Range    Free Testosterone Calculated 45.10 ng/dL    Testosterone Total 1,672 (H) 240 - 950 ng/dL    Narrative    This test was developed and its performance characteristics determined by the [facility],  Special Chemistry Laboratory. It has not been cleared or approved by the FDA. The laboratory is regulated under CLIA as qualified to perform high-complexity testing. This test is used for clinical purposes. It should not be regarded as investigational or for research.   Testosterone Bioavailable     Status: Abnormal    Narrative    The following orders were created for panel order Testosterone Bioavailable.  Procedure                               Abnormality         Status                     ---------                               -----------         ------                     Sex Hormone Binding Glob...[[id number]]  Normal              Final result               Testosterone Free and Total[[id number]]  Abnormal            Final result                 Please view results for these tests on the individual orders.           If you have any questions or concerns, please call the clinic at the number listed above.       Sincerely,                  "

## 2024-01-22 NOTE — PROGRESS NOTES
Assessment & Plan     Dion was seen today for hypertension.    Diagnoses and all orders for this visit:    Benign hypertension  Blood pressure continues to be elevated.  Advised patient to continue with previously prescribed amlodipine 10 mg and metoprolol 100 mg.  I restarted his lisinopril 20 mg once daily today.  -     amLODIPine (NORVASC) 10 MG tablet; Take 1 tablet (10 mg) by mouth daily  -     metoprolol succinate ER (TOPROL XL) 100 MG 24 hr tablet; Take 1 tablet (100 mg) by mouth daily  -     lisinopril (ZESTRIL) 20 MG tablet; Take 1 tablet (20 mg) by mouth daily    Restless leg syndrome  -     hydrOXYzine HCl (ATARAX) 25 MG tablet; Take 1 tablet (25 mg) by mouth 3 times daily as needed for itching    Insomnia, unspecified type  -     hydrOXYzine HCl (ATARAX) 25 MG tablet; Take 1 tablet (25 mg) by mouth 3 times daily as needed for itching    Testicular hypofunction  Testosterone deficiency  -     Testosterone Bioavailable; Future  -     testosterone cypionate (DEPOTESTOSTERONE) 200 MG/ML injection; Inject 1 ml (200 mg) as directed every 3 weeks          Subjective   Dion is a 58 year old, presenting for the following health issues:  Hypertension        1/22/2024     1:02 PM   Additional Questions   Roomed by Kaushal MOISE     History of Present Illness       Hypertension: He presents for follow up of hypertension.  He does not check blood pressure  regularly outside of the clinic. Outpatient blood pressures have not been over 140/90. He does not follow a low salt diet.     He eats 0-1 servings of fruits and vegetables daily.He consumes 1 sweetened beverage(s) daily.He exercises with enough effort to increase his heart rate 10 to 19 minutes per day.  He exercises with enough effort to increase his heart rate 4 days per week.   He is taking medications regularly.     Patient presents to clinic for hypertension follow-up.  He is currently taking amlodipine 10 mg (85% of the time) and metoprolol 100 mg once  "daily.    Review of Systems  Constitutional, HEENT, cardiovascular, pulmonary, gi and gu systems are negative, except as otherwise noted.      Objective    BP (!) 144/81   Pulse 50   Temp 99.6  F (37.6  C) (Temporal)   Resp 16   Ht 1.801 m (5' 10.9\")   Wt 122.5 kg (270 lb)   SpO2 95%   BMI 37.76 kg/m    Body mass index is 37.76 kg/m .  Physical Exam   GENERAL: alert and no distress  RESP: lungs clear to auscultation - no rales, rhonchi or wheezes  CV: regular rate and rhythm, normal S1 S2, no S3 or S4, no murmur, click or rub, no peripheral edema  MS: no gross musculoskeletal defects noted, no edema          Signed Electronically by: Diaz Jaffe MD  "

## 2024-01-23 LAB — SHBG SERPL-SCNC: 35 NMOL/L (ref 11–80)

## 2024-01-24 DIAGNOSIS — E29.1 TESTICULAR HYPOFUNCTION: Primary | ICD-10-CM

## 2024-01-24 DIAGNOSIS — E34.9 TESTOSTERONE DEFICIENCY: ICD-10-CM

## 2024-01-24 LAB
TESTOST FREE SERPL-MCNC: 45.1 NG/DL
TESTOST SERPL-MCNC: 1672 NG/DL (ref 240–950)

## 2024-01-24 RX ORDER — TESTOSTERONE CYPIONATE 1000 MG/10ML
50 INJECTION, SOLUTION INTRAMUSCULAR
Qty: 1 ML | Refills: 0 | Status: SHIPPED | OUTPATIENT
Start: 2024-01-24 | End: 2024-01-24

## 2024-01-24 RX ORDER — TESTOSTERONE CYPIONATE 200 MG/ML
INJECTION, SOLUTION INTRAMUSCULAR
Qty: 2 ML | Refills: 3 | Status: SHIPPED | OUTPATIENT
Start: 2024-01-24 | End: 2024-07-25

## 2024-01-24 NOTE — RESULT ENCOUNTER NOTE
Patient is taking supplemental testosterone. Goal is 400-950 total testosterone. We lowered the dose to 100mg every 14 days.   MD Ld

## 2024-03-14 DIAGNOSIS — F32.1 CURRENT MODERATE EPISODE OF MAJOR DEPRESSIVE DISORDER WITHOUT PRIOR EPISODE (H): ICD-10-CM

## 2024-04-03 ENCOUNTER — TELEPHONE (OUTPATIENT)
Dept: GASTROENTEROLOGY | Facility: CLINIC | Age: 59
End: 2024-04-03
Payer: COMMERCIAL

## 2024-04-03 NOTE — TELEPHONE ENCOUNTER
Verify bowel habits - patient on Suboxone. May need Extended prep. Patient was sent Standard Miralax prep by .    ------------------------------------------------------------------------    Pre visit planning completed.      Procedure details:    Patient scheduled for Colonoscopy on 4/17/24.     Arrival time: 1200. Procedure time 1300    Facility location[facility]; [facility] [place] [address]. Check in location: 1st [facility].     Sedation type: MAC    Pre op exam needed? Yes.  Patient needs to complete a pre-operative exam prior to procedure.  Informed patient pre-op is needed via [employer] message    Indication for procedure: Screening       Chart review:     Electronic implanted devices? Yes - [employer] ICD    Recent diagnosis of diverticulitis within the last 6 weeks? No    Diabetic? No      Medication review:    Anticoagulants? No    NSAIDS? No NSAID medications per patient's medication list.  RN will verify with pre-assessment call.    Other medication HOLDING recommendations:  N/A      Prep for procedure:     Bowel prep recommendation:  Verify bowel habits - Extended Golytely vs Standard Miralax (patient was sent Standard Miralax prep by )  Due to: chronic pain medication noted in chart. - chronic use of suboxone.     Prep instructions will need to be resent via [employer].      Jeri Espinal RN  Endoscopy Procedure Pre Assessment RN  [phone] option 4

## 2024-04-03 NOTE — TELEPHONE ENCOUNTER
Pre assessment completed for upcoming procedure.   (Please see previous telephone encounter notes for complete details)      Procedure details:    Arrival time and facility location reviewed.    Pre op exam needed? Yes. Patient is aware that pre op is required and stated they will schedule with PCP clinic (within [facility]). Patient declined to take PAC scheduling number, only wanted to schedule with PCP clinic.     Designated  policy reviewed. Instructed to have someone stay 24  hours post procedure.       Medication review:    Medications reviewed. Please see supporting documentation below. Holding recommendations discussed (if applicable).   NSAID medication(s): Ibuprofen (Advil, Motrin): HOLD 1 day before procedure.      Prep for procedure:     Patient denies constipation, reports 5 bowel movements weekly, does not use laxatives or stool softeners. Appropriate for Standard Miralax prep.     Procedure prep instructions reviewed.      Bowel prep instructions sent via [employer].     Any additional information needed:  N/A      Patient  verbalized understanding and had no questions or concerns at this time.      Jeri Espinal RN  Endoscopy Procedure Pre Assessment RN  [phone] option 4

## 2024-04-03 NOTE — LETTER
April 9, 2024      Dion Yepez  [address]  [facility] [address]              Dear Dion,      Colonoscopy      Procedure date: 4/17/24    Anticipated arrival time: 12:00 PM   (Please note that arrival times may change)    Facility location: [facility]; [address] [place] [address] - [facility] Parking information: Self pay parking available in [employer] [facility]. The Skyway Ramp is  located across [facility] from the hospital and is connected to the  hospital by a skyway. The skyway access is on Level C of the parking ramp.   parking is available Monday through Friday from 7 a.m.-3 p.m.  parking attendants are stationed at Door 2 at the [facility] entrance,  located off of [address]      Important Procedure Reminders:     A Pre-Operative Physical is required prior to your upcoming procedure.     Please be sure to schedule a pre-operative physical with your primary care provider within 30 days of your procedure date. If your primary care provider is outside the [facility] system, please have your clinic fax report to [phone].     If you do not have a primary care provider, an in-person clinic appointment can also be scheduled with our Pre-Operative Assessment Center (PAC) at [facility] located at [address] [place] [address]. Virtual PAC appointments will not be accepted. To schedule an in-person PAC visit, please call the Pre-Operative Assessment Center at [phone].     Prep Instructions:   Instructions on how to prepare for your upcoming procedure are found below. Please read instructions carefully. Deviation from instructions may result in less than desired outcomes and procedure may need to be rescheduled.   If you have additional questions regarding how to prepare for your upcoming procedure, contact our endoscopy pre assessment nurses at [phone] option 4 Monday through  Friday 7:00am-5:00pm     Policy:   The medications used during the procedure will make you sleepy, so you won't be able to drive. On the day of your procedure, please have an adult ready to drive you home and stay with you for the next 24 hours.   You can't use public transportation, ride-share services, or non-medical taxi services without a responsible caregiver. Medical transport services are okay, but a caregiver must be there to receive you at your destination.   Make sure your  and caregiver are confirmed before your procedure.    Day of procedure:  Please keep in mind that arrival times may change. Let your  know there might be a one-hour window for changes.  We ask that you please check in at the  with your . Your  should remain on campus.  Expect to be at the procedure center for about 1.5-2.5 hours.    Please do not wear jewelry (i.e. earrings, rings, necklaces, watches, etc). Leave your purse, billfold, credit cards, and other valuables at home.   Bring insurance card and ID.     To cancel or reschedule your procedure:   Within 14 days of your procedure if you develop any flu-like symptoms (such as fever, cough, shortness of breath), COVID-19 like symptoms or exposure to COVID-19, contact our endoscopy team at [phone] option 4 to determine if procedure can be completed or needs to be delayed.   If you need to cancel or reschedule, our endoscopy scheduling team can be reached at [phone], option 2. Monday through Friday, 7:00am-5:00pm.      Medication Reminders:    Please note the following medication holding recommendations:   NSAID medication(s): Ibuprofen (Advil, Motrin): HOLD 1 day before  procedure.    ----------------------------------------------------------------------------------------------------------------------------------------------------------------------------------------------------------------------------------------------------------------------                      Standard MiraLAX Bowel Prep  Prep Instructions for your Colonoscopy  Pre-Assessment Phone Number: [facility]; [phone] option 4    Please read these instructions carefully at least 7 days prior to your colonoscopy procedure. Be sure to follow all directions completely. The inside of your colon must be clean to allow for a complete examination for the presence of any growths, polyps, and/or abnormalities, as well as their biopsy or removal. A number of tips are included in order to make this part of the procedure as comfortable as possible.    Getting ready:   A nurse will call you to go over instructions and your health history.  It's important to complete the nurse assessment before your procedure. If you don't, your procedure might have to be canceled.  You must arrange for an adult to drive you home after your exam. Your colonoscopy cannot be done unless you have a ride. If you need to use public transportation, someone must ride with you and stay with you for a minimum of 24 hours.  Check with your insurance company to be sure they will cover this exam.  Go to the drug store and buy:  Four (4) - Dulcolax (Bisacodyl) 5mg tablets   8.3 ounce bottle of Miralax powder  64 ounces of Gatorade (not red or purple)     10 ounce bottle of clear magnesium citrate    7 days before the exam:  Talk to your prescribing provider: If you take blood thinners (such as Coumadin, Plavix, Xarelto, Eliquis, Lovenox, or others), these medications may need to be stopped temporarily before your procedure. Your prescribing provider will tell you what to do.   Talk to your prescribing provider: If you take prescription NSAIDS (such as  Sulindac, Celebrex, Mobic, Relafen). Your prescribing provider will tell you what to do.   Stop taking fiber and iron supplements   Stop eating corn, popcorn, nuts and foods that contain seeds. These can stay in the colon for many days and they can clog up the colonoscope.     3 days before the exam:  Begin a low-fiber diet (see below).   Drink at least 4 to 6 large glasses of water or sports drink (not red or purple) each day.     One day before the exam:  Only clear liquid diet is allowed (see below). No solid food should be eaten.   Drink at least 8 to 10 full glasses of clear liquids during the day.   Stop taking NSAID pain relievers, such as Advil, Ibuprofen, Motrin, etc.  You may take Tylenol.  Note: You will start drinking the colonoscopy prep solution at 5 PM. The timing of second step depends on your appointment arrival time. See Steps 1-2 below.    Step One:  At 4 PM, take 2 Dulcolax (Bisacodyl) tablets.   At 4 PM, mix the entire bottle of Miralax with 64 ounces of Gatorade in a pitcher and stir to dissolve the powder. Chill if desired, but do not add ice.   At 5 PM, start drinking an 8-ounce glass of the Miralax and Gatorade mixture every 15 minutes until the pitcher is HALF empty (about 4 glasses).  Store the rest in the refrigerator.   Bowel movements usually begin about one hour after your finish this first dose of Miralax. The stool is likely to be brown at this stage.   After you start drinking the solution, stay near a toilet. You may have watery stools (diarrhea), mild cramping, bloating , and nausea.   You may want to use Vaseline on the skin around your anus after each bowel movement to prevent irritation. You can also use wet wipes to prevent irritation.  Continue to drink clear liquids.     At 10 PM, take 2 Dulcolax (Bisacodyl) tablets  At 10 PM start drinking an 8-ounce glass of Miralax and Gatorade mixture every 15 minutes until the pitcher is empty (about 4 glasses).     Step Two:    If you  arrive for your procedure after 11 AM:     At 6 AM on the day of the exam: drink 10 ounces of clear Magnesium Citrate      Day of exam:  You may drink clear liquids only up until 2 hours before your arrival time.  You may take your necessary morning medications with sips of water  Do not take diabetes medicine by mouth until after your exam.  Do not smoke, chew tobacco, or swallow anything, including water or gum for at least 2 hours before your arrival time. This is a safety issue. Your procedure could be cancelled if you do not follow directions.  Please do not wear jewelry (i.e. earrings, rings, necklaces, watches, etc) . Leave your purse, billfold, credit cards, and other valuables at home.    Please arrive with a responsible adult who can take you home after the test and stay with you for a minimum of 24 hours: The medicine used will make you sleepy and forgetful. If you do not have someone to take you home, we will cancel your procedure. If using public transportation you must have someone to ride with you.  Please perform your nebulizer treatments and airway clearance therapy in the morning prior to the procedure (if applicable).  If you have asthma, bring your inhalers.       CLEAR LIQUID DIET   You may have:    Water, tea, coffee (no milk or cream)  Soda pop, Gatorade (not red or purple)  Jell-O, Popsicles (no milk or fruit pieces - not red or purple)  Fat-free soup broth or bouillon  Plain hard candy, such as clear life savers (not red or purple)  Clear juices and fruit-flavored drinks, such as apple juice, white grape juice, Hi-C, and Manohar-Aid (not red or purple)   Do not have:    Milk or milk products such as ice cream, malts or shakes, or coffee creamer  Red or purple drinks of any kind such as cranberry juice or grape juice. Avoid red or purple Jell-O, Popsicles, Manohar-Aid, sorbet, sherbet and candy  Juices with pulp such as orange, grapefruit, pineapple or tomato juice  Cream soups of any kind  Alcohol  and beer  Protein drinks or protein powder     LOW FIBER DIET   You may have:    Starches: White bread, rolls, biscuits, croissants, [employer] toast, white flour tortillas, waffles, pancakes, Syriac toast; white rice, noodles, pasta, macaroni; cooked and peeled potatoes; plain crackers, saltines; cooked farina or cream of rice; puffed rice, corn flakes, Rice Krispies, Special K   Vegetables: tender cooked and canned, vegetable broths  Fruits and fruit juices: Strained fruit juice, canned fruit without seeds or skin (not pineapple), applesauce, pear sauce, ripe bananas, melons (not watermelon)   Milk products: Milk (plain or flavored), cheese, cottage cheese, yogurt (no berries), custard, ice cream    Proteins: Tender, well-cooked ground beef, lamb, veal, ham, pork, chicken, turkey, fish or organ meats; eggs; creamy peanut butter   Fats and condiments:  Margarine, butter, oils, mayonnaise, sour cream, salad dressing, plain gravy; spices, cooked herbs; sugar, clear jelly, honey, syrup   Snacks, sweets and drinks: Pretzels, hard candy; plain cakes and cookies (no nuts or seeds); gelatin, plain pudding, sherbet, Popsicles; coffee, tea, carbonated ( fizzy ) drinks Do not have:    Starches: Breads or rolls that contain nuts, seeds or fruit; whole wheat or whole grain breads that contain more than 1 gram of fiber per slice; cornbread; corn or whole wheat tortillas; potatoes with skin; brown rice, wild rice, kasha (buckwheat), and oatmeal   Vegetables: Any raw or steamed vegetables; vegetables with seeds; corn in any form   Fruits and fruit juices: Prunes, prune juice, raisins and other dried fruits, berries and other fruits with seeds, canned pineapple juices with pulp such as orange, grapefruit, pineapple or tomato juice  Milk products: Any yogurt with nuts, seeds or berries   Proteins: Tough, fibrous meats with gristle; cooked dried beans, peas or lentils; crunchy peanut butter  Fats and condiments: Pickles, olives, relish,  horseradish; jam, marmalade, preserves   Snacks, sweets and drinks: Popcorn, nuts, seeds, granola, coconut, candies made with nuts or seeds; all desserts that contain nuts, seeds, raisins and other dried fruits, coconut, whole grains or bran.      FAQ:  Why should Miralax be mixed with Gatorade or another clear sports drink?   It is important that your body does not develop an imbalance of electrolytes with the large volume of fluid in this prep. Gatorade/sports drinks contains those electrolytes.   Does Miralax have to be mixed with Gatorade?  Gatorade, Powerade, or any of the other sports drinks are acceptable. If you are diabetic, it is acceptable to use the low sugar options, such as Gatorade Zero, Powerade Zero, G2, etc.  Can I put ice in the Gatorade/Miralax mixture?  We prefer that you mix it and put it in the refrigerator to chill instead of using ice. The ice will melt and dilute the laxative.    Why should the Miralax prep be taken in several steps?   The stool is flushed out by a large wave of fluid going through the colon. Just sipping a large volume of the solution will not achieve the desired result. Studies have shown that two smaller waves (or more in some cases) are better than one large one.    Why are the second Miralax dose and Magnesium Citrate so close to the exam?   The intestine continues to produce mucus and waste. Longer intervals between the prep and the exam can lead to less than desired results. However, the stomach must be empty at the time of the exam in order to allow safe sedation. Therefore, there should be nothing by mouth 2 hours before the exam is started.   How do you know if your colon is cleaned out?   After completing the bowel prep, your bowel movements should be all liquid and yellow. Your bowel movements will look similar to urine in the toilet. If there are pieces of stool (poop) in the toilet, or if you can't see to the bottom of the toilet, please call our office for  advice. Call [phone] and ask to speak with a nurse.   Why do you need a responsible  to take you home and stay with you?  We require a responsible adult to take you home for your safety. The sedation medicines used to relax you during the procedure can impair your judgement and reaction time, and make you forgetful and possibly a little unsteady. Do not drive, make any important decisions, or sign any legal documents for 24 hours after your procedure.   It is normal to feel bloated and gassy after your procedure. Walking will help move the air through your colon. You can take non-aspirin pain relievers that contain acetaminophen (Tylenol).     When can you eat after your procedure?  You may resume your normal diet when you feel ready, unless advised otherwise by the doctor performing your procedure. Do not drink alcohol for 24 hours after your procedure.   You many resume normal activities (work, exercise, etc.) after 24 hours.     When will you get test results?  You should have your procedure results and any lab results (if applicable) by letter, MyChart message, or phone call within 2 weeks. If you have any questions, please call the doctor that referred you for the procedure.         Updated: 06/22/2022

## 2024-04-09 NOTE — TELEPHONE ENCOUNTER
Pre assessment previously completed.     Attempted to contact patient in order to provide reminder that a pre op physical exam is required prior to procedure.     No answer. Left message of pre op reminder and instructed patient to return call to [phone] option 4 to let us know once pre op is scheduled.     Pre op exam needed? Yes.  Patient needs to complete a pre-operative exam prior to procedure.  Informed patient pre-op is needed via voicemail message, [employer] message, and Letter    Prep instructions sent via letter. (It appears patient has not logged into [employer] since 1/24/24- sent all info via letter to ensure patient has received info)      Jeri Espinal RN  Endoscopy Procedure Pre Assessment RN

## 2024-04-11 NOTE — TELEPHONE ENCOUNTER
Per Daily Walsh, Nurse Manager Endoscopy: If patient does not get their H&P they can proceed with their scheduled colonoscopy on 4.17.2024 with CS.     Mei Coleman RN

## 2024-04-17 ENCOUNTER — TELEPHONE (OUTPATIENT)
Dept: GASTROENTEROLOGY | Facility: CLINIC | Age: 59
End: 2024-04-17
Payer: COMMERCIAL

## 2024-04-17 RX ORDER — ONDANSETRON 2 MG/ML
4 INJECTION INTRAMUSCULAR; INTRAVENOUS
Status: CANCELLED | OUTPATIENT
Start: 2024-04-17

## 2024-04-17 RX ORDER — HYDROXYZINE HYDROCHLORIDE 25 MG/1
25 TABLET, FILM COATED ORAL EVERY 6 HOURS PRN
Status: CANCELLED | OUTPATIENT
Start: 2024-04-17

## 2024-04-17 RX ORDER — HYDROMORPHONE HCL IN WATER/PF 6 MG/30 ML
0.4 PATIENT CONTROLLED ANALGESIA SYRINGE INTRAVENOUS EVERY 5 MIN PRN
Status: CANCELLED | OUTPATIENT
Start: 2024-04-17

## 2024-04-17 RX ORDER — ONDANSETRON 4 MG/1
4 TABLET, ORALLY DISINTEGRATING ORAL EVERY 6 HOURS PRN
Status: CANCELLED | OUTPATIENT
Start: 2024-04-17

## 2024-04-17 RX ORDER — ONDANSETRON 2 MG/ML
4 INJECTION INTRAMUSCULAR; INTRAVENOUS EVERY 30 MIN PRN
Status: CANCELLED | OUTPATIENT
Start: 2024-04-17

## 2024-04-17 RX ORDER — HYDROMORPHONE HCL IN WATER/PF 6 MG/30 ML
0.2 PATIENT CONTROLLED ANALGESIA SYRINGE INTRAVENOUS EVERY 5 MIN PRN
Status: CANCELLED | OUTPATIENT
Start: 2024-04-17

## 2024-04-17 RX ORDER — PROCHLORPERAZINE MALEATE 10 MG
10 TABLET ORAL EVERY 6 HOURS PRN
Status: CANCELLED | OUTPATIENT
Start: 2024-04-17

## 2024-04-17 RX ORDER — NALOXONE HYDROCHLORIDE 0.4 MG/ML
0.4 INJECTION, SOLUTION INTRAMUSCULAR; INTRAVENOUS; SUBCUTANEOUS
Status: CANCELLED | OUTPATIENT
Start: 2024-04-17

## 2024-04-17 RX ORDER — ONDANSETRON 4 MG/1
4 TABLET, ORALLY DISINTEGRATING ORAL EVERY 30 MIN PRN
Status: CANCELLED | OUTPATIENT
Start: 2024-04-17

## 2024-04-17 RX ORDER — NALOXONE HYDROCHLORIDE 0.4 MG/ML
0.2 INJECTION, SOLUTION INTRAMUSCULAR; INTRAVENOUS; SUBCUTANEOUS
Status: CANCELLED | OUTPATIENT
Start: 2024-04-17

## 2024-04-17 RX ORDER — NALOXONE HYDROCHLORIDE 0.4 MG/ML
0.1 INJECTION, SOLUTION INTRAMUSCULAR; INTRAVENOUS; SUBCUTANEOUS
Status: CANCELLED | OUTPATIENT
Start: 2024-04-17

## 2024-04-17 RX ORDER — HYDRALAZINE HYDROCHLORIDE 20 MG/ML
2.5-5 INJECTION INTRAMUSCULAR; INTRAVENOUS EVERY 10 MIN PRN
Status: CANCELLED | OUTPATIENT
Start: 2024-04-17

## 2024-04-17 RX ORDER — LIDOCAINE 40 MG/G
CREAM TOPICAL
Status: CANCELLED | OUTPATIENT
Start: 2024-04-17

## 2024-04-17 RX ORDER — FENTANYL CITRATE 50 UG/ML
50 INJECTION, SOLUTION INTRAMUSCULAR; INTRAVENOUS EVERY 5 MIN PRN
Status: CANCELLED | OUTPATIENT
Start: 2024-04-17

## 2024-04-17 RX ORDER — SODIUM CHLORIDE, SODIUM LACTATE, POTASSIUM CHLORIDE, CALCIUM CHLORIDE 600; 310; 30; 20 MG/100ML; MG/100ML; MG/100ML; MG/100ML
INJECTION, SOLUTION INTRAVENOUS CONTINUOUS
Status: CANCELLED | OUTPATIENT
Start: 2024-04-17

## 2024-04-17 RX ORDER — FENTANYL CITRATE 50 UG/ML
25 INJECTION, SOLUTION INTRAMUSCULAR; INTRAVENOUS EVERY 5 MIN PRN
Status: CANCELLED | OUTPATIENT
Start: 2024-04-17

## 2024-04-17 RX ORDER — LABETALOL HYDROCHLORIDE 5 MG/ML
10 INJECTION, SOLUTION INTRAVENOUS
Status: CANCELLED | OUTPATIENT
Start: 2024-04-17

## 2024-04-17 RX ORDER — ACETAMINOPHEN 325 MG/1
975 TABLET ORAL ONCE
Status: CANCELLED | OUTPATIENT
Start: 2024-04-17 | End: 2024-04-17

## 2024-04-17 RX ORDER — FLUMAZENIL 0.1 MG/ML
0.2 INJECTION, SOLUTION INTRAVENOUS
Status: CANCELLED | OUTPATIENT
Start: 2024-04-17 | End: 2024-04-17

## 2024-04-17 RX ORDER — DIMENHYDRINATE 50 MG/ML
25 INJECTION, SOLUTION INTRAMUSCULAR; INTRAVENOUS
Status: CANCELLED | OUTPATIENT
Start: 2024-04-17

## 2024-04-17 RX ORDER — ONDANSETRON 2 MG/ML
4 INJECTION INTRAMUSCULAR; INTRAVENOUS EVERY 6 HOURS PRN
Status: CANCELLED | OUTPATIENT
Start: 2024-04-17

## 2024-04-17 NOTE — TELEPHONE ENCOUNTER
Caller: Writer to patient    Reason for Reschedule/Cancellation   (please be detailed, any staff messages or encounters to note?): No show      Prior to reschedule please review:  Ordering Provider: Diaz Jaffe  Sedation Determined: MAC  Does patient have any ASC Exclusions, please identify?: No      Notes on Cancelled Procedure:  Procedure: Lower Endoscopy [Colonoscopy]   Date: 4/17/2024  Location: [facility]; [facility] [address] [place] [address]   Surgeon: Robson      Rescheduled: No, sent MyChart and CTL.        Did you cancel or rescheduled an EUS procedure? No.

## 2024-04-17 NOTE — TELEPHONE ENCOUNTER
----- Message from Jo Ann Thompson RN sent at 4/17/2024  1:06 PM CDT -----  Regarding: cx pt  Pt did not show up for procedure tried to contact pt multiple times prior to procedure to find out if he had a pre op never answered back

## 2024-05-01 DIAGNOSIS — G47.00 INSOMNIA, UNSPECIFIED TYPE: ICD-10-CM

## 2024-05-01 DIAGNOSIS — G25.81 RESTLESS LEG SYNDROME: ICD-10-CM

## 2024-05-01 RX ORDER — HYDROXYZINE HYDROCHLORIDE 25 MG/1
25 TABLET, FILM COATED ORAL 3 TIMES DAILY PRN
Qty: 90 TABLET | Refills: 1 | Status: SHIPPED | OUTPATIENT
Start: 2024-05-01

## 2024-05-06 DIAGNOSIS — I10 BENIGN HYPERTENSION: ICD-10-CM

## 2024-05-06 RX ORDER — AMLODIPINE BESYLATE 10 MG/1
10 TABLET ORAL DAILY
Qty: 90 TABLET | Refills: 1 | OUTPATIENT
Start: 2024-05-06

## 2024-06-17 DIAGNOSIS — F32.1 CURRENT MODERATE EPISODE OF MAJOR DEPRESSIVE DISORDER WITHOUT PRIOR EPISODE (H): ICD-10-CM

## 2024-07-25 DIAGNOSIS — I10 BENIGN HYPERTENSION: ICD-10-CM

## 2024-07-25 DIAGNOSIS — E29.1 TESTICULAR HYPOFUNCTION: ICD-10-CM

## 2024-07-25 RX ORDER — TESTOSTERONE CYPIONATE 200 MG/ML
INJECTION, SOLUTION INTRAMUSCULAR
Qty: 2 ML | Refills: 3 | Status: SHIPPED | OUTPATIENT
Start: 2024-07-25

## 2024-07-25 RX ORDER — AMLODIPINE BESYLATE 10 MG/1
10 TABLET ORAL DAILY
Qty: 90 TABLET | Refills: 0 | Status: SHIPPED | OUTPATIENT
Start: 2024-07-25

## 2024-07-25 NOTE — TELEPHONE ENCOUNTER
"Patient is due for hypertension management visit.  Please call patient and assist with scheduling a follow-up in person visit. \"Collaborative hypertension visit-double book anytime.\"    MD Ld  "

## 2024-09-12 DIAGNOSIS — I10 BENIGN HYPERTENSION: ICD-10-CM

## 2024-09-13 RX ORDER — LISINOPRIL 20 MG/1
20 TABLET ORAL DAILY
Qty: 90 TABLET | Refills: 1 | Status: SHIPPED | OUTPATIENT
Start: 2024-09-13

## 2024-09-17 DIAGNOSIS — F32.1 CURRENT MODERATE EPISODE OF MAJOR DEPRESSIVE DISORDER WITHOUT PRIOR EPISODE (H): ICD-10-CM

## 2024-10-20 DIAGNOSIS — I10 BENIGN HYPERTENSION: ICD-10-CM

## 2024-10-21 RX ORDER — AMLODIPINE BESYLATE 10 MG/1
10 TABLET ORAL DAILY
Qty: 90 TABLET | Refills: 0 | OUTPATIENT
Start: 2024-10-21

## 2024-11-15 ENCOUNTER — PATIENT OUTREACH (OUTPATIENT)
Dept: CARE COORDINATION | Facility: CLINIC | Age: 59
End: 2024-11-15
Payer: COMMERCIAL

## 2025-02-22 ENCOUNTER — HEALTH MAINTENANCE LETTER (OUTPATIENT)
Age: 60
End: 2025-02-22

## 2025-03-01 DIAGNOSIS — I10 BENIGN HYPERTENSION: ICD-10-CM

## 2025-03-04 RX ORDER — LISINOPRIL 20 MG/1
20 TABLET ORAL DAILY
Qty: 90 TABLET | Refills: 1 | OUTPATIENT
Start: 2025-03-04

## 2025-05-30 NOTE — TELEPHONE ENCOUNTER
Please have patient schedule BP check with nurse.   Routing refill request to provider for review/approval because:  Labs out of range and not current:  testosterone